# Patient Record
Sex: MALE | Race: WHITE | Employment: FULL TIME | URBAN - METROPOLITAN AREA
[De-identification: names, ages, dates, MRNs, and addresses within clinical notes are randomized per-mention and may not be internally consistent; named-entity substitution may affect disease eponyms.]

---

## 2020-06-29 ENCOUNTER — TELEPHONE (OUTPATIENT)
Dept: NEUROLOGY | Age: 55
End: 2020-06-29

## 2020-06-29 NOTE — TELEPHONE ENCOUNTER
----- Message from Leelee Cueto sent at 6/29/2020 11:21 AM EDT -----  Regarding: Dr. iAden Karimi calling to schedule her  an appt.  Contact is [phone]

## 2020-07-06 ENCOUNTER — TELEPHONE (OUTPATIENT)
Dept: NEUROLOGY | Age: 55
End: 2020-07-06

## 2020-08-25 NOTE — PROGRESS NOTES
Assessment and Plan   Diagnoses and all orders for this visit:    1. Early onset Alzheimer's dementia without behavioral disturbance ([facility]  Followed by Dr. Arleen Salinas with [facility]. Has neuropsych testing pending. Notes a significant cognitive decline over the past year with negative work-up so far    2. Restless leg syndrome  -     FERRITIN; Future  -     IRON PROFILE; Future  Ropinirole has been helping but he is still having a significant amount of kicking at night. Recommend increasing ropinirole to 1.5 mg for a week and then increasing to 2 mg. If symptoms still not well controlled on 2, advised to let me know    3. Other migraine without status migrainosus, not intractable  Well-controlled without medicines. Occurs every couple years    4. Bipolar affective disorder, remission status unspecified ([facility]  5. Mild episode of recurrent major depressive disorder ([facility].)  6. Attention deficit disorder, unspecified hyperactivity presence  Followed by Dr. Diann García with psychiatry. Continue medications as directed by them. Currently on Adderall 30, Lexapro 5, Risperdal 1    7. Acquired hypothyroidism  -     TSH 3RD GENERATION; Future  Takes his medication with other meds. Counseled to take it alone. Denies any hypothyroid symptoms    8. Chronic allergic rhinitis  Had sinus surgery 2 weeks ago with Dr. Maricel Shaver with [facility]. Continue Singulair and Allegra    9. Gastroesophageal reflux disease without esophagitis  Continue pantoprazole. Has tried coming off of pantoprazole before and had significant symptoms    10. Hyperlipidemia, unspecified hyperlipidemia type  -     LIPID PANEL; Future  Denies any issues with rosuvastatin. Continue medication    11. Dry eyes  Continue Restasis. Reports negative Sjogren testing in the past    12. Obesity (BMI 30-39.9)  -     CBC WITH AUTOMATED DIFF; Future  -     HEMOGLOBIN A1C WITH EAG; Future  -     METABOLIC PANEL, COMPREHENSIVE; Future    13.  Routine adult health maintenance  We will try to get colonoscopy report from [facility] in [place]. Done in 2018, recommend 10-year follow-up      Benefits, risks, possible drug interactions, and side effects of all new medications were reviewed with the patient. Pt verbalized understanding. Return to clinic: 6 months for general follow-up or earlier as needed    Parmjit Liang MD  [facility]  8/26/2020    No future appointments. History of Present Illness   Chief Complaint   [facility] care    Tegan  is a 49 y.o. male     Moved from [place]. Recently diagnosed with most likely early Alzheimer's. Wife has noted a significant cognitive decline over the past year. Review of Systems   Constitutional: Negative for chills and fever. HENT: Positive for hearing loss (Working on getting hearing aids). Eyes: Negative for blurred vision. Respiratory: Negative for shortness of breath. Cardiovascular: Negative for chest pain. Gastrointestinal: Negative for abdominal pain, blood in stool, constipation, diarrhea, melena, nausea and vomiting. Genitourinary: Negative for dysuria and hematuria. Musculoskeletal: Negative for joint pain. Skin: Negative for rash. Neurological: Negative for headaches. Past Medical History     Allergies   Allergen Reactions    Iodinated Contrast Media Hives     Non-contrast IVP Dye      Other Medication Other (comments)     Vitorin causes Headache       Rifampin Other (comments)        Current Outpatient Medications   Medication Sig    amphetamine-dextroamphetamine XR (ADDERALL XR) 30 mg XR capsule TAKE 1 CAPSULE BY MOUTH DAILY.  FILL ON OR AFTER 8/14    escitalopram oxalate (LEXAPRO) 5 mg tablet TAKE 1 TABLET BY MOUTH EVERY DAY    levothyroxine (SYNTHROID) 50 mcg tablet TAKE 1 TABLET BY MOUTH EVERY DAY    montelukast (SINGULAIR) 10 mg tablet TAKE 1 TABLET BY MOUTH EVERY DAY    pantoprazole (PROTONIX) 40 mg tablet TAKE 1 TABLET BY MOUTH EVERY DAY    risperiDONE (RisperDAL) 1 mg tablet TAKE 1 TABLET BY MOUTH EVERYDAY AT BEDTIME    rOPINIRole (REQUIP) 1 mg tablet TAKE 1 TABLET BY MOUTH 1 TO 3 HOURS BEFORE BED    rosuvastatin (CRESTOR) 20 mg tablet TAKE 1 TABLET BY MOUTH EVERY DAY    Multivitamins with Fluoride (MULTI-VITAMIN PO) Take  by mouth.  cholecalciferol (VITAMIN D3) (1000 Units /25 mcg) tablet Take  by mouth daily.  Lactobac no.41/Bifidobact no.7 (PROBIOTIC-10 PO) Take  by mouth.  MAGNESIUM PO Take 200 mg by mouth.  BIOTIN PO Take  by mouth.  fexofenadine (ALLEGRA) 180 mg tablet Take  by mouth. No current facility-administered medications for this visit. Patient Active Problem List   Diagnosis Code    Hyperlipidemia, unspecified hyperlipidemia type E78.5    Obesity (BMI 30-39. 9) E66.9    Acquired hypothyroidism E03.9    Restless leg syndrome G25.81    Attention deficit disorder, unspecified hyperactivity presence F98.8    Mild episode of recurrent major depressive disorder (HCC) F33.0    Bipolar disorder (HCC) F31.9    Chronic allergic rhinitis J30.9    Gastroesophageal reflux disease without esophagitis K21.9    Early onset Alzheimer's dementia without behavioral disturbance (HCC) G30.0, F02.80    Dry eyes H04.123    Other migraine without status migrainosus, not intractable G43.809    Vitamin D deficiency E55.9     Past Surgical History:   Procedure Laterality Date    HX APPENDECTOMY      HX CHOLECYSTECTOMY      HX OTHER SURGICAL      Sinus Surgery    HX UROLOGICAL      varicocele repair      Social History     Tobacco Use    Smoking status: Never Smoker    Smokeless tobacco: Never Used   Substance Use Topics    Alcohol use: Yes     Frequency: 2-3 times a week     Drinks per session: 1 or 2     Binge frequency: Never      Family History   Problem Relation Age of Onset    Cancer Mother         breast x2    Bipolar Disorder Father     Stroke Father 68    Cancer Maternal Grandmother     Cancer Paternal Grandmother         ?pancreatic    Thyroid Disease Paternal Grandmother         Physical Exam   Vitals:       Visit Vitals  /68 (BP 1 Location: Left arm, BP Patient Position: Sitting)   Pulse 74   Temp 98.9 °F (37.2 °C) (Oral)   Resp 14   Ht 5' 10.5\" (1.791 m)   Wt 219 lb 3.2 oz (99.4 kg)   SpO2 96%   BMI 31.01 kg/m²        Physical Exam  Constitutional:       General: He is not in acute distress. Appearance: He is well-developed. HENT:      Right Ear: Tympanic membrane, ear canal and external ear normal.      Left Ear: Tympanic membrane, ear canal and external ear normal.   Eyes:      Extraocular Movements: Extraocular movements intact. Conjunctiva/sclera: Conjunctivae normal.   Neck:      Musculoskeletal: Neck supple. Cardiovascular:      Rate and Rhythm: Normal rate and regular rhythm. Pulses: Normal pulses. Heart sounds: No murmur. No friction rub. No gallop. Pulmonary:      Effort: No respiratory distress. Breath sounds: No wheezing or rales. Abdominal:      General: Bowel sounds are normal. There is no distension. Palpations: Abdomen is soft. There is no hepatomegaly, splenomegaly or mass. Tenderness: There is no abdominal tenderness. Hernia: No hernia is present. Skin:     General: Skin is warm. Findings: No rash. Neurological:      Mental Status: He is alert. Gait: Gait normal.   Psychiatric:         Mood and Affect: Mood normal.         Thought Content:  Thought content normal.         Judgment: Judgment normal.          Voice recognition software is utilized and this note may contain transcription errors

## 2020-08-26 ENCOUNTER — OFFICE VISIT (OUTPATIENT)
Dept: INTERNAL MEDICINE CLINIC | Age: 55
End: 2020-08-26

## 2020-08-26 VITALS
OXYGEN SATURATION: 96 % | BODY MASS INDEX: 30.69 KG/M2 | SYSTOLIC BLOOD PRESSURE: 109 MMHG | HEIGHT: 71 IN | DIASTOLIC BLOOD PRESSURE: 68 MMHG | WEIGHT: 219.2 LBS | TEMPERATURE: 98.9 F | RESPIRATION RATE: 14 BRPM | HEART RATE: 74 BPM

## 2020-08-26 DIAGNOSIS — F31.9 BIPOLAR AFFECTIVE DISORDER, REMISSION STATUS UNSPECIFIED (HCC): ICD-10-CM

## 2020-08-26 DIAGNOSIS — E66.9 OBESITY (BMI 30-39.9): ICD-10-CM

## 2020-08-26 DIAGNOSIS — F02.80 EARLY ONSET ALZHEIMER'S DEMENTIA WITHOUT BEHAVIORAL DISTURBANCE (HCC): Primary | ICD-10-CM

## 2020-08-26 DIAGNOSIS — F98.8 ATTENTION DEFICIT DISORDER, UNSPECIFIED HYPERACTIVITY PRESENCE: ICD-10-CM

## 2020-08-26 DIAGNOSIS — J30.9 CHRONIC ALLERGIC RHINITIS: ICD-10-CM

## 2020-08-26 DIAGNOSIS — K21.9 GASTROESOPHAGEAL REFLUX DISEASE WITHOUT ESOPHAGITIS: ICD-10-CM

## 2020-08-26 DIAGNOSIS — E03.9 ACQUIRED HYPOTHYROIDISM: ICD-10-CM

## 2020-08-26 DIAGNOSIS — G43.809 OTHER MIGRAINE WITHOUT STATUS MIGRAINOSUS, NOT INTRACTABLE: ICD-10-CM

## 2020-08-26 DIAGNOSIS — Z00.00 ROUTINE ADULT HEALTH MAINTENANCE: ICD-10-CM

## 2020-08-26 DIAGNOSIS — G30.0 EARLY ONSET ALZHEIMER'S DEMENTIA WITHOUT BEHAVIORAL DISTURBANCE (HCC): Primary | ICD-10-CM

## 2020-08-26 DIAGNOSIS — F33.0 MILD EPISODE OF RECURRENT MAJOR DEPRESSIVE DISORDER (HCC): ICD-10-CM

## 2020-08-26 DIAGNOSIS — E78.5 HYPERLIPIDEMIA, UNSPECIFIED HYPERLIPIDEMIA TYPE: ICD-10-CM

## 2020-08-26 DIAGNOSIS — H04.123 DRY EYES: ICD-10-CM

## 2020-08-26 DIAGNOSIS — G25.81 RESTLESS LEG SYNDROME: ICD-10-CM

## 2020-08-26 PROCEDURE — 99204 OFFICE O/P NEW MOD 45 MIN: CPT | Performed by: INTERNAL MEDICINE

## 2020-08-26 RX ORDER — MELATONIN
DAILY
COMMUNITY

## 2020-08-26 RX ORDER — RISPERIDONE 1 MG/1
2 TABLET, FILM COATED ORAL
COMMUNITY
Start: 2020-08-17 | End: 2022-01-12 | Stop reason: SDUPTHER

## 2020-08-26 RX ORDER — LEVOTHYROXINE SODIUM 50 UG/1
TABLET ORAL
COMMUNITY
Start: 2020-07-31 | End: 2021-09-02 | Stop reason: SDUPTHER

## 2020-08-26 RX ORDER — MINERAL OIL
ENEMA (ML) RECTAL
COMMUNITY
End: 2021-04-01

## 2020-08-26 RX ORDER — DEXTROAMPHETAMINE SACCHARATE, AMPHETAMINE ASPARTATE MONOHYDRATE, DEXTROAMPHETAMINE SULFATE AND AMPHETAMINE SULFATE 7.5; 7.5; 7.5; 7.5 MG/1; MG/1; MG/1; MG/1
CAPSULE, EXTENDED RELEASE ORAL
COMMUNITY
Start: 2020-08-17 | End: 2021-11-10

## 2020-08-26 RX ORDER — ESCITALOPRAM OXALATE 5 MG/1
10 TABLET ORAL DAILY
COMMUNITY
Start: 2020-06-29

## 2020-08-26 RX ORDER — ROPINIROLE 1 MG/1
TABLET, FILM COATED ORAL
COMMUNITY
Start: 2020-08-17 | End: 2020-09-01 | Stop reason: SDUPTHER

## 2020-08-26 RX ORDER — PANTOPRAZOLE SODIUM 40 MG/1
TABLET, DELAYED RELEASE ORAL
COMMUNITY
Start: 2020-07-31

## 2020-08-26 RX ORDER — MONTELUKAST SODIUM 10 MG/1
TABLET ORAL
COMMUNITY
Start: 2020-08-12 | End: 2020-12-23

## 2020-08-26 RX ORDER — ROSUVASTATIN CALCIUM 20 MG/1
TABLET, COATED ORAL
COMMUNITY
Start: 2020-07-31

## 2020-08-26 RX ORDER — CYCLOSPORINE 0.5 MG/ML
1 EMULSION OPHTHALMIC EVERY 12 HOURS
COMMUNITY

## 2020-08-26 NOTE — PATIENT INSTRUCTIONS
Increase ropinirole to 1.5mg for a week then increase to 2mg for a week then message me to let me know how it's working for you.

## 2020-09-01 DIAGNOSIS — G25.81 RESTLESS LEG SYNDROME: Primary | ICD-10-CM

## 2020-09-01 RX ORDER — ROPINIROLE 2 MG/1
2 TABLET, FILM COATED ORAL
Qty: 90 TAB | Refills: 3 | Status: SHIPPED | OUTPATIENT
Start: 2020-09-01 | End: 2021-06-28

## 2020-09-02 LAB
ALBUMIN SERPL-MCNC: 4.4 G/DL (ref 3.8–4.9)
ALBUMIN/GLOB SERPL: 1.9 {RATIO} (ref 1.2–2.2)
ALP SERPL-CCNC: 58 IU/L (ref 39–117)
ALT SERPL-CCNC: 17 IU/L (ref 0–44)
AST SERPL-CCNC: 25 IU/L (ref 0–40)
BASOPHILS # BLD AUTO: 0.1 X10E3/UL (ref 0–0.2)
BASOPHILS NFR BLD AUTO: 1 %
BILIRUB SERPL-MCNC: 0.7 MG/DL (ref 0–1.2)
BUN SERPL-MCNC: 10 MG/DL (ref 6–24)
BUN/CREAT SERPL: 8 (ref 9–20)
CALCIUM SERPL-MCNC: 9.6 MG/DL (ref 8.7–10.2)
CHLORIDE SERPL-SCNC: 103 MMOL/L (ref 96–106)
CHOLEST SERPL-MCNC: 123 MG/DL (ref 100–199)
CO2 SERPL-SCNC: 24 MMOL/L (ref 20–29)
CREAT SERPL-MCNC: 1.21 MG/DL (ref 0.76–1.27)
EOSINOPHIL # BLD AUTO: 0.1 X10E3/UL (ref 0–0.4)
EOSINOPHIL NFR BLD AUTO: 2 %
ERYTHROCYTE [DISTWIDTH] IN BLOOD BY AUTOMATED COUNT: 13.4 % (ref 11.6–15.4)
EST. AVERAGE GLUCOSE BLD GHB EST-MCNC: 117 MG/DL
FERRITIN SERPL-MCNC: 137 NG/ML (ref 30–400)
GLOBULIN SER CALC-MCNC: 2.3 G/DL (ref 1.5–4.5)
GLUCOSE SERPL-MCNC: 108 MG/DL (ref 65–99)
HBA1C MFR BLD: 5.7 % (ref 4.8–5.6)
HCT VFR BLD AUTO: 46.5 % (ref 37.5–51)
HDLC SERPL-MCNC: 68 MG/DL
HGB BLD-MCNC: 15 G/DL (ref 13–17.7)
IMM GRANULOCYTES # BLD AUTO: 0 X10E3/UL (ref 0–0.1)
IMM GRANULOCYTES NFR BLD AUTO: 0 %
INTERPRETATION, 910389: NORMAL
IRON SATN MFR SERPL: 18 % (ref 15–55)
IRON SERPL-MCNC: 57 UG/DL (ref 38–169)
LDLC SERPL CALC-MCNC: 40 MG/DL (ref 0–99)
LYMPHOCYTES # BLD AUTO: 1.9 X10E3/UL (ref 0.7–3.1)
LYMPHOCYTES NFR BLD AUTO: 24 %
MCH RBC QN AUTO: 29 PG (ref 26.6–33)
MCHC RBC AUTO-ENTMCNC: 32.3 G/DL (ref 31.5–35.7)
MCV RBC AUTO: 90 FL (ref 79–97)
MONOCYTES # BLD AUTO: 0.9 X10E3/UL (ref 0.1–0.9)
MONOCYTES NFR BLD AUTO: 11 %
NEUTROPHILS # BLD AUTO: 4.9 X10E3/UL (ref 1.4–7)
NEUTROPHILS NFR BLD AUTO: 62 %
PLATELET # BLD AUTO: 259 X10E3/UL (ref 150–450)
POTASSIUM SERPL-SCNC: 4.4 MMOL/L (ref 3.5–5.2)
PROT SERPL-MCNC: 6.7 G/DL (ref 6–8.5)
RBC # BLD AUTO: 5.17 X10E6/UL (ref 4.14–5.8)
SODIUM SERPL-SCNC: 141 MMOL/L (ref 134–144)
TIBC SERPL-MCNC: 323 UG/DL (ref 250–450)
TRIGL SERPL-MCNC: 76 MG/DL (ref 0–149)
TSH SERPL DL<=0.005 MIU/L-ACNC: 1.59 UIU/ML (ref 0.45–4.5)
UIBC SERPL-MCNC: 266 UG/DL (ref 111–343)
VLDLC SERPL CALC-MCNC: 15 MG/DL (ref 5–40)
WBC # BLD AUTO: 7.9 X10E3/UL (ref 3.4–10.8)

## 2020-09-03 PROBLEM — R73.03 PREDIABETES: Status: ACTIVE | Noted: 2020-09-03

## 2020-09-03 NOTE — PROGRESS NOTES
Addendum   09/03/20   [employer] message sent. A1c prediabetic.   Iron panel normal.  Thyroid studies normal.

## 2020-09-15 ENCOUNTER — E-VISIT (OUTPATIENT)
Dept: INTERNAL MEDICINE CLINIC | Age: 55
End: 2020-09-15
Payer: COMMERCIAL

## 2020-09-15 DIAGNOSIS — L23.7 POISON IVY: Primary | ICD-10-CM

## 2020-09-15 PROCEDURE — 99421 OL DIG E/M SVC 5-10 MIN: CPT | Performed by: INTERNAL MEDICINE

## 2020-09-15 RX ORDER — PREDNISONE 10 MG/1
TABLET ORAL
Qty: 63 TAB | Refills: 0 | Status: SHIPPED | OUTPATIENT
Start: 2020-09-15 | End: 2020-10-03

## 2020-09-15 RX ORDER — HYDROXYZINE PAMOATE 25 MG/1
25 CAPSULE ORAL
Qty: 30 CAP | Refills: 1 | Status: SHIPPED | OUTPATIENT
Start: 2020-09-15 | End: 2020-12-23

## 2020-09-15 RX ORDER — TRIAMCINOLONE ACETONIDE 1 MG/G
CREAM TOPICAL
Qty: 15 G | Refills: 1 | Status: SHIPPED | OUTPATIENT
Start: 2020-09-15 | End: 2020-12-23

## 2020-09-15 NOTE — TELEPHONE ENCOUNTER
Received ED visit for poison ivy rash. Difficult to see on pictures he sent but given history, will treat as poison ivy with medications that have worked in the past including prednisone taper, topical steroid, and Atarax. Advised to let us know if rash does not improve

## 2020-09-21 ENCOUNTER — E-VISIT (OUTPATIENT)
Dept: INTERNAL MEDICINE CLINIC | Age: 55
End: 2020-09-21

## 2020-09-21 DIAGNOSIS — N52.9 ERECTILE DYSFUNCTION, UNSPECIFIED ERECTILE DYSFUNCTION TYPE: ICD-10-CM

## 2020-09-21 DIAGNOSIS — Z11.3 SCREENING EXAMINATION FOR STD (SEXUALLY TRANSMITTED DISEASE): Primary | ICD-10-CM

## 2020-09-21 RX ORDER — SILDENAFIL 100 MG/1
100 TABLET, FILM COATED ORAL AS NEEDED
Qty: 30 TAB | Refills: 1 | Status: SHIPPED | OUTPATIENT
Start: 2020-09-21 | End: 2021-05-31

## 2020-09-22 NOTE — TELEPHONE ENCOUNTER
Patient is interested in starting prep. Will get STD testing first and discuss starting medications at next visit.

## 2020-09-26 LAB
C TRACH RRNA SPEC QL NAA+PROBE: NEGATIVE
HBV CORE AB SERPL QL IA: NEGATIVE
HBV CORE IGM SERPL QL IA: NEGATIVE
HBV SURFACE AB SER QL: NON REACTIVE
HBV SURFACE AG SERPL QL IA: NEGATIVE
HCV AB S/CO SERPL IA: 0.1 S/CO RATIO (ref 0–0.9)
HIV 1+2 AB+HIV1 P24 AG SERPL QL IA: NON REACTIVE
N GONORRHOEA RRNA SPEC QL NAA+PROBE: NEGATIVE
T VAGINALIS DNA SPEC QL NAA+PROBE: NEGATIVE
TREPONEMA PALLIDUM IGG+IGM AB [PRESENCE] IN SERUM OR PLASMA BY IMMUNOASSAY: NON REACTIVE

## 2020-09-28 NOTE — PROGRESS NOTES
Reissued message sent. Gonorrhea, chlamydia, trichomonas negative. Hepatitis B-. No surface antibody. Syphilis negative. HIV negative. Hepatitis C negative. Has appointment tomorrow to discuss prep. We will also see if he has already had 2 rounds of hepatitis B vaccines. If not, recommend getting it.

## 2020-09-28 NOTE — TELEPHONE ENCOUNTER
[employer] message sent. Gonorrhea, chlamydia, trichomonas negative. Hepatitis B-. No surface antibody. Syphilis negative. HIV negative. Hepatitis C negative. Has appointment tomorrow to discuss prep. We will also see if he has already had 2 rounds of hepatitis B vaccines. If not, recommend getting it.

## 2020-11-04 DIAGNOSIS — L23.7 POISON IVY: ICD-10-CM

## 2020-11-05 RX ORDER — PREDNISONE 10 MG/1
TABLET ORAL
Qty: 63 TAB | Refills: 0 | OUTPATIENT
Start: 2020-11-05

## 2020-11-05 NOTE — TELEPHONE ENCOUNTER
Spoke to patients wife - did not request refill - will disregard.     Kay De Los Santos, PharmD, BCPS, CDCES

## 2020-12-05 ENCOUNTER — HOSPITAL ENCOUNTER (EMERGENCY)
Age: 55
Discharge: HOME | End: 2020-12-05
Attending: EMERGENCY MEDICINE
Payer: COMMERCIAL

## 2020-12-05 VITALS
OXYGEN SATURATION: 98 % | HEART RATE: 89 BPM | RESPIRATION RATE: 16 BRPM | SYSTOLIC BLOOD PRESSURE: 119 MMHG | WEIGHT: 214.29 LBS | BODY MASS INDEX: 30.68 KG/M2 | TEMPERATURE: 97.5 F | HEIGHT: 70 IN | DIASTOLIC BLOOD PRESSURE: 72 MMHG

## 2020-12-05 DIAGNOSIS — S61.213A LACERATION OF LEFT MIDDLE FINGER WITHOUT FOREIGN BODY WITHOUT DAMAGE TO NAIL, INITIAL ENCOUNTER: Primary | ICD-10-CM

## 2020-12-05 PROCEDURE — 77030010507 HC ADH SKN DERMBND J&J -B

## 2020-12-05 PROCEDURE — 99282 EMERGENCY DEPT VISIT SF MDM: CPT

## 2020-12-05 PROCEDURE — 90715 TDAP VACCINE 7 YRS/> IM: CPT | Performed by: STUDENT IN AN ORGANIZED HEALTH CARE EDUCATION/TRAINING PROGRAM

## 2020-12-05 PROCEDURE — 74011250636 HC RX REV CODE- 250/636: Performed by: STUDENT IN AN ORGANIZED HEALTH CARE EDUCATION/TRAINING PROGRAM

## 2020-12-05 PROCEDURE — 77030002916 HC SUT ETHLN J&J -A

## 2020-12-05 PROCEDURE — 75810000293 HC SIMP/SUPERF WND  RPR

## 2020-12-05 PROCEDURE — 74011000250 HC RX REV CODE- 250: Performed by: STUDENT IN AN ORGANIZED HEALTH CARE EDUCATION/TRAINING PROGRAM

## 2020-12-05 PROCEDURE — 90471 IMMUNIZATION ADMIN: CPT

## 2020-12-05 PROCEDURE — 2709999900 HC NON-CHARGEABLE SUPPLY

## 2020-12-05 RX ORDER — CEPHALEXIN 500 MG/1
500 CAPSULE ORAL 2 TIMES DAILY
Qty: 10 CAP | Refills: 0 | Status: SHIPPED | OUTPATIENT
Start: 2020-12-05 | End: 2020-12-10

## 2020-12-05 RX ORDER — DONEPEZIL HYDROCHLORIDE 10 MG/1
10 TABLET, FILM COATED ORAL
COMMUNITY
End: 2022-01-28

## 2020-12-05 RX ORDER — LIDOCAINE HYDROCHLORIDE 10 MG/ML
10 INJECTION, SOLUTION EPIDURAL; INFILTRATION; INTRACAUDAL; PERINEURAL ONCE
Status: COMPLETED | OUTPATIENT
Start: 2020-12-05 | End: 2020-12-05

## 2020-12-05 RX ORDER — SULFAMETHOXAZOLE AND TRIMETHOPRIM 800; 160 MG/1; MG/1
1 TABLET ORAL 2 TIMES DAILY
Qty: 14 TAB | Refills: 0 | Status: SHIPPED | OUTPATIENT
Start: 2020-12-05 | End: 2020-12-05

## 2020-12-05 RX ADMIN — LIDOCAINE HYDROCHLORIDE 10 ML: 10 INJECTION, SOLUTION EPIDURAL; INFILTRATION; INTRACAUDAL; PERINEURAL at 12:45

## 2020-12-05 RX ADMIN — TETANUS TOXOID, REDUCED DIPHTHERIA TOXOID AND ACELLULAR PERTUSSIS VACCINE, ADSORBED 0.5 ML: 5; 2.5; 8; 8; 2.5 SUSPENSION INTRAMUSCULAR at 12:45

## 2020-12-05 NOTE — DISCHARGE INSTRUCTIONS
Patient Education        Cuts on the Hand Closed With Stitches: Care Instructions  Your Care Instructions     A cut on your hand can be on your fingers, your thumb, or the front or back of your hand. Sometimes a cut can injure the tendons, blood vessels, or nerves of your hand. The doctor used stitches to close the cut. Using stitches also helps the cut heal and reduces scarring. The doctor may have given you a splint to help prevent you from moving your hand, fingers, or thumb. If the cut went deep and through the skin, the doctor put in two layers of stitches. The deeper layer brings the deep part of the cut together. These stitches will dissolve and don't need to be removed. The stitches in the upper layer are the ones you see on the cut. You will probably have a bandage. You will need to have the stitches removed, usually in 7 to 14 days. The doctor may suggest that you see a hand specialist if the cut is very deep or if you have trouble moving your fingers or have less feeling in your hand. The doctor has checked you carefully, but problems can develop later. If you notice any problems or new symptoms, get medical treatment right away. Follow-up care is a key part of your treatment and safety. Be sure to make and go to all appointments, and call your doctor if you are having problems. It's also a good idea to know your test results and keep a list of the medicines you take. How can you care for yourself at home? · Keep the cut dry for the first 24 to 48 hours. After this, you can shower if your doctor okays it. Pat the cut dry. · Don't soak the cut, such as in a bathtub. Your doctor will tell you when it's safe to get the cut wet. · If your doctor told you how to care for your cut, follow your doctor's instructions. If you did not get instructions, follow this general advice:  ? After the first 24 to 48 hours, wash around the cut with clean water 2 times a day.  Don't use hydrogen peroxide or alcohol, which can slow healing. ? You may cover the cut with a thin layer of petroleum jelly, such as Vaseline, and a nonstick bandage. ? Apply more petroleum jelly and replace the bandage as needed. · Prop up the sore hand on a pillow anytime you sit or lie down during the next 3 days. Try to keep it above the level of your heart. This will help reduce swelling. · Avoid any activity that could cause your cut to reopen. · Do not remove the stitches on your own. Your doctor will tell you when to come back to have the stitches removed. · Be safe with medicines. Take pain medicines exactly as directed. ? If the doctor gave you a prescription medicine for pain, take it as prescribed. ? If you are not taking a prescription pain medicine, ask your doctor if you can take an over-the-counter medicine. When should you call for help? Call your doctor now or seek immediate medical care if:    · You have new pain, or your pain gets worse.     · The skin near the cut is cold or pale or changes color.     · You have tingling, weakness, or numbness near the cut.     · The cut starts to bleed, and blood soaks through the bandage. Oozing small amounts of blood is normal.     · You have trouble moving the area of the hand near the cut.     · You have symptoms of infection, such as:  ? Increased pain, swelling, warmth, or redness around the cut.  ? Red streaks leading from the cut.  ? Pus draining from the cut.  ? A fever. Watch closely for changes in your health, and be sure to contact your doctor if:    · You do not get better as expected. Where can you learn more? Go to http://www.gray.com/  Enter T250 in the search box to learn more about \"Cuts on the Hand Closed With Stitches: Care Instructions. \"  Current as of: June 26, 2019               Content Version: 12.6  © 6446-7871 [employer].    Care instructions adapted under license by [employer] (which disclaims liability or warranty for this information). If you have questions about a medical condition or this instruction, always ask your healthcare professional. [place] [address] any warranty or liability for your use of this information.

## 2020-12-05 NOTE — ED TRIAGE NOTES
Pt presents to ED with c/o laceration to volar surface of left 3rd finger at PIP joint. Pt states he dropped an object that he was carrying onto the finger. Pt states some loss of sensation at distal tip of finger.

## 2020-12-05 NOTE — ED PROVIDER NOTES
Patient is a 48year old male who presents to ED c/o laceration to left middle finger which occurred PTA. Patient reports he was lifting up his tool box when it came down and cut his finger. Patient reports he has full ROM of his finger, but that he cannot stop the bleeding. Patient reports slight numbness to the volar aspect of the left middle finger distal to the laceration at PIP joint. Patient denies any other injury. Patient reports last tetanus vaccine was in 2014.             Past Medical History:   Diagnosis Date    Bipolar disorder ([facility]     Restless leg syndrome     Vitamin D deficiency        Past Surgical History:   Procedure Laterality Date    HX APPENDECTOMY      HX CHOLECYSTECTOMY      HX OTHER SURGICAL      Sinus Surgery    HX UROLOGICAL      varicocele repair         Family History:   Problem Relation Age of Onset   [facility] Cancer Mother         breast x2    Bipolar Disorder Father     Stroke Father 68    Cancer Maternal Grandmother     Cancer Paternal Grandmother         ?pancreatic    Thyroid Disease Paternal Grandmother        Social History     Socioeconomic History    Marital status:      Spouse name: Not on file    Number of children: Not on file    Years of education: Not on file    Highest education level: Not on file   Occupational History    Not on file   Social Needs    Financial resource strain: Not on file    Food insecurity     Worry: Not on file     Inability: Not on file    Transportation needs     Medical: Not on file     Non-medical: Not on file   Tobacco Use    Smoking status: Never Smoker    Smokeless tobacco: Never Used   Substance and Sexual Activity    Alcohol use: Yes     Frequency: 2-3 times a week     Drinks per session: 1 or 2     Binge frequency: Never    Drug use: Never    Sexual activity: Yes     Partners: Female, Male     Birth control/protection: Surgical   Lifestyle    Physical activity     Days per week: Not on file     Minutes per session: Not on file    Stress: Not on file   Relationships    Social connections     Talks on phone: Not on file     Gets together: Not on file     Attends Restorationism service: Not on file     Active member of club or organization: Not on file     Attends meetings of clubs or organizations: Not on file     Relationship status: Not on file    Intimate partner violence     Fear of current or ex partner: Not on file     Emotionally abused: Not on file     Physically abused: Not on file     Forced sexual activity: Not on file   Other Topics Concern    Not on file   Social History Narrative    Not on file         ALLERGIES: Iodinated contrast media; Other medication; and Rifampin    Review of Systems   Constitutional: Negative for chills and fever. Musculoskeletal: Negative for back pain, gait problem, joint swelling, neck pain and neck stiffness. Skin: Positive for wound. Negative for color change, pallor and rash. Allergic/Immunologic: Negative for immunocompromised state. Neurological: Positive for numbness. Negative for syncope and weakness. Vitals:    12/05/20 1216   BP: 119/72   Pulse: 89   Resp: 16   Temp: 97.5 °F (36.4 °C)   SpO2: 98%   Weight: 97.2 kg (214 lb 4.6 oz)   Height: 5' 10\" (1.778 m)            Physical Exam  Vitals signs and nursing note reviewed. Constitutional:       Appearance: Normal appearance. He is well-developed. Comments: Pleasant male in NAD   HENT:      Head: Normocephalic and atraumatic. Nose: Nose normal.      Mouth/Throat:      Mouth: Mucous membranes are moist.   Eyes:      General: Lids are normal.      Extraocular Movements: Extraocular movements intact. Conjunctiva/sclera: Conjunctivae normal.   Neck:      Musculoskeletal: Normal range of motion and neck supple. Cardiovascular:      Rate and Rhythm: Normal rate. Pulses: Normal pulses.       Heart sounds: S1 normal and S2 normal.   Pulmonary:      Effort: Pulmonary effort is normal. No accessory muscle usage.   Abdominal:      General: Abdomen is flat. Palpations: Abdomen is soft. Musculoskeletal: Normal range of motion. Skin:     General: Skin is warm. Capillary Refill: Capillary refill takes less than 2 seconds. Comments: Left middle finger with laceration to the volar aspect of the PIP joint. Full ROM and 5/5 strength in flexion and extension at PIP and DIP joints. Cap refill 2+. Neurological:      General: No focal deficit present. Mental Status: He is alert and oriented to person, place, and time. Mental status is at baseline. Psychiatric:         Attention and Perception: Attention normal.         Mood and Affect: Mood and affect normal.         Speech: Speech normal.         Behavior: Behavior normal. Behavior is cooperative. Thought Content: Thought content normal.         Cognition and Memory: Cognition normal.         Judgment: Judgment normal.          MDM  Number of Diagnoses or Management Options  Laceration of left middle finger without foreign body without damage to nail, initial encounter:   Diagnosis management comments: Laceration repaired without any immediate complications. Will start patient on Cephlexin for 5 days due to contaminated wound and location over PIP joint. Advised patient to keep finger straight for the next few days in order to allow laceration to heal. Discussed with patient plan to have sutures removed in 7-10 days. Patient has improved with ED treatment. Patient's results have been reviewed with them. Patient verbally conveyed their understanding and agreement of the signs, symptoms, diagnosis and treatment plan and agrees to follow up as recommended. Discharge instructions have also been provided to the patient with some educational information regarding their diagnosis as well a list of reasons why they would want to return to the ED prior to their follow-up appointment should their condition change.          Amount and/or Complexity of Data Reviewed  Discuss the patient with other providers: yes (Dr. Geo Mock, ED Attending)           Wound Repair    Date/Time: 12/5/2020 2:21 PM  Performed by: PAPreparation: skin prepped with alcohol  Location details: left long finger  Wound length: 2cm.   Anesthesia: local infiltration    Anesthesia:  Local Anesthetic: lidocaine 1% without epinephrine  Anesthetic total: 5 mL  Foreign bodies: no foreign bodies  Irrigation solution: saline  Irrigation method: syringe  Debridement: minimal  Skin closure: 4-0 nylon  Number of sutures: 4  Technique: simple  Approximation: close  Dressing: tube gauze  Patient tolerance: Patient tolerated the procedure well with no immediate complications

## 2020-12-13 ENCOUNTER — HOSPITAL ENCOUNTER (EMERGENCY)
Age: 55
Discharge: HOME | End: 2020-12-13
Attending: EMERGENCY MEDICINE
Payer: COMMERCIAL

## 2020-12-13 VITALS
RESPIRATION RATE: 18 BRPM | OXYGEN SATURATION: 96 % | WEIGHT: 219.58 LBS | HEIGHT: 70 IN | HEART RATE: 84 BPM | TEMPERATURE: 97.7 F | BODY MASS INDEX: 31.44 KG/M2 | DIASTOLIC BLOOD PRESSURE: 69 MMHG | SYSTOLIC BLOOD PRESSURE: 129 MMHG

## 2020-12-13 DIAGNOSIS — L08.9 WOUND INFECTION: Primary | ICD-10-CM

## 2020-12-13 DIAGNOSIS — T14.8XXA WOUND INFECTION: Primary | ICD-10-CM

## 2020-12-13 PROCEDURE — 99282 EMERGENCY DEPT VISIT SF MDM: CPT

## 2020-12-13 PROCEDURE — 74011250637 HC RX REV CODE- 250/637: Performed by: EMERGENCY MEDICINE

## 2020-12-13 PROCEDURE — 74011000250 HC RX REV CODE- 250: Performed by: EMERGENCY MEDICINE

## 2020-12-13 RX ORDER — BACITRACIN 500 UNIT/G
1 PACKET (EA) TOPICAL
Status: COMPLETED | OUTPATIENT
Start: 2020-12-13 | End: 2020-12-13

## 2020-12-13 RX ORDER — SULFAMETHOXAZOLE AND TRIMETHOPRIM 800; 160 MG/1; MG/1
1 TABLET ORAL 2 TIMES DAILY
Qty: 20 TAB | Refills: 0 | Status: SHIPPED | OUTPATIENT
Start: 2020-12-13 | End: 2020-12-23

## 2020-12-13 RX ORDER — SULFAMETHOXAZOLE AND TRIMETHOPRIM 800; 160 MG/1; MG/1
1 TABLET ORAL
Status: COMPLETED | OUTPATIENT
Start: 2020-12-13 | End: 2020-12-13

## 2020-12-13 RX ADMIN — SULFAMETHOXAZOLE AND TRIMETHOPRIM 1 TABLET: 800; 160 TABLET ORAL at 21:22

## 2020-12-13 RX ADMIN — BACITRACIN 1 PACKET: 500 OINTMENT TOPICAL at 21:21

## 2020-12-14 NOTE — ED TRIAGE NOTES
Patient complains of redness and swelling to left index finger. Reports having stitches placed her this past week.

## 2020-12-14 NOTE — DISCHARGE INSTRUCTIONS
Patient Education        Wound infection: Care Instructions  Your Care Instructions  Sometimes a cut may opens when it isn't supposed to. This may be because of an infection or another problem that keeps the cut's edges from staying together. The doctor has checked your open cut. He or she may have put a dressing in the cut but left it open to heal. This lets the cut heal from the bottom up. Your doctor may have given you a vacuum device to take home that helps close the cut. A cut may be left open when it is infected or likely to become infected. This is because closing the cut may make an existing infection worse and a new infection more likely. You will have a bandage. Follow-up care is a key part of your treatment and safety. Be sure to make and go to all appointments, and call your doctor if you are having problems. It's also a good idea to know your test results and keep a list of the medicines you take. How can you care for yourself at home? · You may shower with soap and water. Your doctor will tell you when it is safe to use a bathtub or go swimming. · If your doctor told you how to care for your cut, follow your doctor's instructions. If you did not get instructions, follow this general advice:  ? Wash around the cut with clean water 2 times a day. Don't use hydrogen peroxide or alcohol, which can slow healing. · Avoid any activity that could cause your cut to get worse. For example, if your cut is in the belly, avoid lifting anything that would make you strain. This may include heavy grocery bags and milk containers, a heavy briefcase or backpack, cat litter or dog food bags, a vacuum , or a child. · Take pain medicines exactly as directed. ? If the doctor gave you a prescription medicine for pain, take it as prescribed. ? If you are not taking a prescription pain medicine, ask your doctor if you can take an over-the-counter medicine.   · If your doctor prescribed antibiotics, take them as directed. Do not stop taking them just because you feel better. You need to take the full course of antibiotics. · If your cut is packed (gauze is put into the cut), follow your doctor's instructions on how often and how to repack the cut. A home health worker may do this for you. When should you call for help? Call your doctor now or seek immediate medical care if:    · The cut gets bigger.     · You can see organs under the skin.     · You have new pain, or your pain gets worse.     · The cut starts to bleed, and blood soaks through the bandage. Oozing small amounts of blood is normal.     · The skin near the cut is cold or pale or changes color.     · You have tingling, weakness, or numbness near the cut.     · You have trouble moving the area near the cut.     · You have symptoms of infection, such as:  ? Increased pain, swelling, warmth, or redness around the cut.  ? Red streaks leading from the cut.  ? Pus draining from the cut.  ? A fever. Watch closely for changes in your health, and be sure to contact your doctor if:    · The cut is not closing (getting smaller).     · You do not get better as expected. Where can you learn more? Go to http://www.gray.com/  Enter L260 in the search box to learn more about \"Opened Cut After Surgery: Care Instructions. \"  Current as of: June 26, 2019               Content Version: 12.6  © 7511-1910 [employer]. Care instructions adapted under license by [employer] (which disclaims liability or warranty for this information). If you have questions about a medical condition or this instruction, always ask your healthcare professional. [address] any warranty or liability for your use of this information.

## 2020-12-14 NOTE — ED PROVIDER NOTES
The patient is a 64-year-old male who is 8 days status post repair of finger laceration and presents to the ED with a complaint of increased swelling, redness to the finger laceration. The patient was discharged on 5 days of Keflex that he took.   He denies any fever, shortness of breath, increased pain and purulent drainage to the area           Past Medical History:   Diagnosis Date    Bipolar disorder ([address]     Restless leg syndrome     Vitamin D deficiency        Past Surgical History:   Procedure Laterality Date    HX APPENDECTOMY      HX CHOLECYSTECTOMY      HX OTHER SURGICAL      Sinus Surgery    HX UROLOGICAL      varicocele repair         Family History:   Problem Relation Age of Onset   [address] [facility] Cancer Mother         breast x2    Bipolar Disorder Father     Stroke Father 68    Cancer Maternal Grandmother     Cancer Paternal Grandmother         ?pancreatic    Thyroid Disease Paternal Grandmother        Social History     Socioeconomic History    Marital status:      Spouse name: Not on file    Number of children: Not on file    Years of education: Not on file    Highest education level: Not on file   Occupational History    Not on file   Social Needs    Financial resource strain: Not on file    Food insecurity     Worry: Not on file     Inability: Not on file    Transportation needs     Medical: Not on file     Non-medical: Not on file   Tobacco Use    Smoking status: Never Smoker    Smokeless tobacco: Never Used   Substance and Sexual Activity    Alcohol use: Yes     Frequency: 2-3 times a week     Drinks per session: 1 or 2     Binge frequency: Never    Drug use: Never    Sexual activity: Yes     Partners: Female, Male     Birth control/protection: Surgical   Lifestyle    Physical activity     Days per week: Not on file     Minutes per session: Not on file    Stress: Not on file   Relationships    Social connections     Talks on phone: Not on file     Gets together: Not on file Attends Yazidism service: Not on file     Active member of club or organization: Not on file     Attends meetings of clubs or organizations: Not on file     Relationship status: Not on file    Intimate partner violence     Fear of current or ex partner: Not on file     Emotionally abused: Not on file     Physically abused: Not on file     Forced sexual activity: Not on file   Other Topics Concern    Not on file   Social History Narrative    Not on file         ALLERGIES: Iodinated contrast media; Other medication; and Rifampin    Review of Systems   All other systems reviewed and are negative. Vitals:    12/13/20 2106   BP: 129/69   Pulse: 84   Resp: 18   Temp: 97.7 °F (36.5 °C)   SpO2: 96%   Weight: 99.6 kg (219 lb 9.3 oz)   Height: 5' 10\" (1.778 m)            Physical Exam  Vitals signs and nursing note reviewed. Exam conducted with a chaperone present. Musculoskeletal:        Hands:         CONSTITUTIONAL: Well-appearing; well-nourished; in no apparent distress  HEAD: Normocephalic; atraumatic  EYES: PERRL; EOM intact; conjunctiva and sclera are clear bilaterally. ENT: No rhinorrhea; normal pharynx with no tonsillar hypertrophy; mucous membranes pink/moist, no erythema, no exudate. NECK: Supple; non-tender; no cervical lymphadenopathy  CARD: Normal S1, S2; no murmurs, rubs, or gallops. Regular rate and rhythm. RESP: Normal respiratory effort; breath sounds clear and equal bilaterally; no wheezes, rhonchi, or rales. ABD: Normal bowel sounds; non-distended; non-tender; no palpable organomegaly, no masses, no bruits. Back Exam: Normal inspection; no vertebral point tenderness, no CVA tenderness. Normal range of motion. SKIN: Warm; dry; no rash.   NEURO:Alert and oriented x 3, coherent, LEOLA-XII grossly intact, sensory and motor are non-focal.        MDM  Number of Diagnoses or Management Options  Wound infection:   Diagnosis management comments: Assessment: Wound dehiscence of left middle finger without any abscess or drainage    Plan: Remove sutures/dressing change/antibiotic/ Monitor and Reevaluate. Amount and/or Complexity of Data Reviewed  Clinical lab tests: ordered and reviewed  Tests in the radiology section of CPT®: ordered and reviewed  Tests in the medicine section of CPT®: reviewed and ordered  Discussion of test results with the performing providers: yes  Decide to obtain previous medical records or to obtain history from someone other than the patient: yes  Obtain history from someone other than the patient: yes  Review and summarize past medical records: yes  Discuss the patient with other providers: yes  Independent visualization of images, tracings, or specimens: yes           Procedures      Progress Note:   Pt has been reexamined by Olya Ortega MD. Pt is feeling much better. Symptoms have improved. All available results have been reviewed with pt and any available family. Pt understands sx, dx, and tx in ED. Care plan has been outlined and questions have been answered. Pt is ready to go home. Will send home on wound dehiscence instruction. Prescription of Bactrim. Outpatient referral with PCP/in 5 days if no improvement of symptoms or discomfort and further treatment as needed. Written by Olya Ortega MD,11:29 PM    .   .

## 2020-12-15 ENCOUNTER — PATIENT MESSAGE (OUTPATIENT)
Dept: INTERNAL MEDICINE CLINIC | Age: 55
End: 2020-12-15

## 2020-12-22 NOTE — PROGRESS NOTES
Assessment and Plan   Diagnoses and all orders for this visit:    1. Urinary urgency  -     PSA W/ REFLX FREE PSA; Future  -     AMB POC URINALYSIS DIP STICK AUTO W/O MICRO  -     tamsulosin (Flomax) 0.4 mg capsule; Take 1 Cap by mouth daily. Reports over the past year, he has noticed increased urgency and nocturia. Denies any change in stream or dribbling. However, has noted in the past 2 weeks that his urgency has gotten worse and that he feels like he has to urinate immediately. No change in volume of urine. Denies any dysuria, fever, chills, bety pain, back pain, nausea, vomiting. UA negative for infection. Recommend a trial of tamsulosin. If no improvement, consider urology referral.  Also recommend checking PSA trying to extract    2. Onychomycosis  -     HEPATIC FUNCTION PANEL; Future  -     CBC WITH AUTOMATED DIFF; Future  -     terbinafine HCL (LAMISIL) 250 mg tablet; Take 1 Tab by mouth daily. Indications: onychomycosis  -     AMB POC URINALYSIS DIP STICK AUTO W/O MICRO  Noticed thickening and darkening of his right big toe and starting to have thickening and yellowing of his right pinky toe. Was told previously to use Vicks but reports it is difficult for him to remember to do something topically every day. He wants to do something p.o. We will start on terbinafine. Advised to get lab work done in a month and a half to monitor medication. 3. On pre-exposure prophylaxis for HIV  Stopped using Truvada as he no longer needed it. Advised that if he ever decides to restart, he needs to get an HIV and STD testing done before. Benefits, risks, possible drug interactions, and side effects of all new medications were reviewed with the patient. Pt verbalized understanding. Return to clinic: 3 months    Merlin Chu MD  [facility]  12/23/2020    No future appointments.      Subjective   Chief Complaint   Urgency    Javier Williamson is a 48 y.o. male Review of Systems   Constitutional: Negative for chills and fever. Respiratory: Negative for shortness of breath. Cardiovascular: Negative for chest pain. Objective   Vitals:       Visit Vitals  /73 (BP 1 Location: Left arm, BP Patient Position: Sitting)   Pulse 92   Temp 98.7 °F (37.1 °C) (Oral)   Resp 16   Ht 5' 10\" (1.778 m)   Wt 218 lb (98.9 kg)   SpO2 100%   BMI 31.28 kg/m²        Physical Exam  Constitutional:       Appearance: Normal appearance. He is not ill-appearing. Cardiovascular:      Rate and Rhythm: Normal rate. Pulmonary:      Effort: No respiratory distress. Skin:     Comments: Right 1st toe thick, dark brown, dystrophic. Right 5th toe yellow   Neurological:      Mental Status: He is alert. Gait: Gait normal.   Psychiatric:         Mood and Affect: Mood normal.         Thought Content: Thought content normal.         Judgment: Judgment normal.          Current Outpatient Medications   Medication Sig    terbinafine HCL (LAMISIL) 250 mg tablet Take 1 Tab by mouth daily. Indications: onychomycosis    tamsulosin (Flomax) 0.4 mg capsule Take 1 Cap by mouth daily.  donepeziL (ARICEPT) 10 mg tablet Take 10 mg by mouth nightly.  sildenafil citrate (VIAGRA) 100 mg tablet Take 1 Tab by mouth as needed for Erectile Dysfunction. Max dose: 1 tab daily    rOPINIRole (REQUIP) 2 mg tablet Take 1 Tab by mouth nightly. Take 1-3hrs before bed  Indications: restless legs syndrome, an extreme discomfort in the calf muscles when sitting or lying down    amphetamine-dextroamphetamine XR (ADDERALL XR) 30 mg XR capsule TAKE 1 CAPSULE BY MOUTH DAILY. FILL ON OR AFTER 8/14    escitalopram oxalate (LEXAPRO) 5 mg tablet Take 5 mg by mouth daily.  levothyroxine (SYNTHROID) 50 mcg tablet TAKE 1 TABLET BY MOUTH EVERY DAY    pantoprazole (PROTONIX) 40 mg tablet TAKE 1 TABLET BY MOUTH EVERY DAY    risperiDONE (RisperDAL) 1 mg tablet 2 mg.     rosuvastatin (CRESTOR) 20 mg tablet TAKE 1 TABLET BY MOUTH EVERY DAY    Multivitamins with Fluoride (MULTI-VITAMIN PO) Take  by mouth.  cholecalciferol (VITAMIN D3) (1000 Units /25 mcg) tablet Take  by mouth daily.  Lactobac no.41/Bifidobact no.7 (PROBIOTIC-10 PO) Take  by mouth.  MAGNESIUM PO Take 200 mg by mouth.  BIOTIN PO Take  by mouth.  fexofenadine (ALLEGRA) 180 mg tablet Take  by mouth.  cycloSPORINE (Restasis) 0.05 % dpet Administer 1 Drop to both eyes every twelve (12) hours.  emtricitabine-tenofovir, TDF, (TRUVADA) 200-300 mg per tablet Take 1 Tab by mouth daily. Indications: HIV infection pre-exposure prophylaxis (Patient not taking: Reported on 12/23/2020)     No current facility-administered medications for this visit.

## 2020-12-23 ENCOUNTER — OFFICE VISIT (OUTPATIENT)
Dept: INTERNAL MEDICINE CLINIC | Age: 55
End: 2020-12-23
Payer: COMMERCIAL

## 2020-12-23 VITALS
WEIGHT: 218 LBS | SYSTOLIC BLOOD PRESSURE: 118 MMHG | HEIGHT: 70 IN | BODY MASS INDEX: 31.21 KG/M2 | HEART RATE: 92 BPM | DIASTOLIC BLOOD PRESSURE: 73 MMHG | OXYGEN SATURATION: 100 % | RESPIRATION RATE: 16 BRPM | TEMPERATURE: 98.7 F

## 2020-12-23 DIAGNOSIS — R39.15 URINARY URGENCY: Primary | ICD-10-CM

## 2020-12-23 DIAGNOSIS — Z79.899 ON PRE-EXPOSURE PROPHYLAXIS FOR HIV: ICD-10-CM

## 2020-12-23 DIAGNOSIS — B35.1 ONYCHOMYCOSIS: ICD-10-CM

## 2020-12-23 LAB
BILIRUB UR QL STRIP: NEGATIVE
GLUCOSE UR-MCNC: NEGATIVE MG/DL
KETONES P FAST UR STRIP-MCNC: NEGATIVE MG/DL
PH UR STRIP: 6 [PH] (ref 4.6–8)
PROT UR QL STRIP: NEGATIVE
SP GR UR STRIP: 1.01 (ref 1–1.03)
UA UROBILINOGEN AMB POC: NORMAL (ref 0.2–1)
URINALYSIS CLARITY POC: CLEAR
URINALYSIS COLOR POC: YELLOW
URINE BLOOD POC: NEGATIVE
URINE LEUKOCYTES POC: NEGATIVE
URINE NITRITES POC: NEGATIVE

## 2020-12-23 PROCEDURE — 99214 OFFICE O/P EST MOD 30 MIN: CPT | Performed by: INTERNAL MEDICINE

## 2020-12-23 PROCEDURE — 81003 URINALYSIS AUTO W/O SCOPE: CPT | Performed by: INTERNAL MEDICINE

## 2020-12-23 RX ORDER — TAMSULOSIN HYDROCHLORIDE 0.4 MG/1
0.4 CAPSULE ORAL DAILY
Qty: 30 CAP | Refills: 3 | Status: SHIPPED | OUTPATIENT
Start: 2020-12-23 | End: 2021-05-10

## 2020-12-23 RX ORDER — TERBINAFINE HYDROCHLORIDE 250 MG/1
250 TABLET ORAL DAILY
Qty: 90 TAB | Refills: 0 | Status: SHIPPED | OUTPATIENT
Start: 2020-12-23 | End: 2021-08-17

## 2020-12-23 NOTE — Clinical Note
Please try to get colonoscopy from [facility] in [facility] Ali[facility] in 2018. Thanks!

## 2020-12-23 NOTE — PROGRESS NOTES
Adelaida Chadwick is a 48 y.o. male    Chief Complaint   Patient presents with    Benign Prostatic Hypertrophy    Skin Problem     fungus on toes - tried to treat it at home but didn't work       Health Maintenance Due   Topic Date Due    Colorectal Cancer Screening Combo  10/29/2016    Flu Vaccine (1) 09/01/2020       Visit Vitals  /73 (BP 1 Location: Left arm, BP Patient Position: Sitting)   Pulse 92   Temp 98.7 °F (37.1 °C) (Oral)   Resp 16   Ht 5' 10\" (1.778 m)   Wt 218 lb (98.9 kg)   SpO2 100%   BMI 31.28 kg/m²       3 most recent PHQ Screens 8/26/2020   Little interest or pleasure in doing things Not at all   Feeling down, depressed, irritable, or hopeless Several days   Total Score PHQ 2 1       Abuse Screening Questionnaire 8/26/2020   Do you ever feel afraid of your partner? N   Are you in a relationship with someone who physically or mentally threatens you? N   Is it safe for you to go home? Y         1. Have you been to the ER, urgent care clinic since your last visit? Hospitalized since your last visit? YES. [facility] 12/13/2020 for cut finger. 2. Have you seen or consulted any other health care providers outside of the [address] [place] [address] since your last visit? Include any pap smears or colon screening.  No

## 2020-12-28 ENCOUNTER — PATIENT MESSAGE (OUTPATIENT)
Dept: INTERNAL MEDICINE CLINIC | Age: 55
End: 2020-12-28

## 2020-12-28 NOTE — TELEPHONE ENCOUNTER
Per PCP request, mailed patient the medical release form that needs to be signed from [facility] in [place]. Nurse filled out a portion of the medical release form, but patient needs to sign, date & return the form to PCP's office to be submitted to [facility] for a copy of patient's 2018 colonoscopy report & applicable pathology report. Nurse included a note in with the mailed medical release form explaining to the patient what was needed.  Thank you

## 2021-03-31 NOTE — PROGRESS NOTES
Assessment and Plan   Diagnoses and all orders for this visit:     See HPI for visit history    Patient presents with multiple complaints. Unclear etiology. Not sure if all of them are due to the same thing or different things    1. Pain in both lower extremities  -     METABOLIC PANEL, COMPREHENSIVE; Future  -     SED RATE (ESR); Future  -     C REACTIVE PROTEIN, QT; Future  -     CK; Future  Has a history of restless leg syndrome on Requip. Reports about 2 weeks ago, he woke up in the middle of the night with severe symptoms that feels like his restless leg symptoms characterized as severe cramping. Since then, symptoms have not resolved. No aggravating or alleviating factors. Symptoms are in his entire leg from his hip to his foot bilaterally. Says he feels like he is done like day at the gym. Pain at its worst is 45 out of 10. Denies any falls, new activities. Normal strength on exam.  Trace pitting edema. No significant tenderness to palpation. Normal muscle tone. We will get labs    2. Cold intolerance  -     CBC WITH AUTOMATED DIFF; Future  -     TSH 3RD GENERATION; Future  -     T4, FREE; Future  Has noted increased cold intolerance. Reports some fatigue but thinks that is related to not sleeping as much. Denies constipation, hair loss, dry skin. Will get labs    3. Swelling of lower extremity  -     PROTEIN/CREATININE RATIO, URINE; Future  Has noted increased lower extremity edema. Reports today's exam is better than normal.  Denies orthopnea, paroxysmal nocturnal dyspnea. Will get labs. Consider echo. Of note, the rest of his labs are going through [facility] but his urine test is not    4. Nausea  -     ondansetron (ZOFRAN ODT) 4 mg disintegrating tablet; Take 1 Tab by mouth every eight (8) hours as needed for Nausea or Vomiting. Reports some episodes of nausea, lightheadedness, and feeling \"out of sorts\" that will last for a few hours. Not doing anything in particular at that time. No chest pain, shortness of breath. \"I know something is wrong but it is hard to describe. \"  No abdominal pain, vomiting. Chronic loose bowels but no changes. Recommend increasing fluids. We will get labs. Of note, he is still on terbinafine. 5. Prostate cancer screening  -     PSA W/ REFLX FREE PSA; Future    Benefits, risks, possible drug interactions, and side effects of all new medications were reviewed with the patient. Pt verbalized understanding. Return to clinic: Pending labs    An electronic signature was used to authenticate this note. Maame Marquez MD  [facility]  4/1/2021    No future appointments.      Subjective   Chief Complaint   Leg pain    Genia Johnson is a 48 y.o. male     8/26/20 NP  Early onset alzheimer's - Dr. Holder [facility] [employer]; significant decline x 1 year  RLS - inc to ropinerole 2mg  Migraines - well controlled without meds, watch  Bipolar  Depression  ADHD - Dr. Elda Kaur with psych; adderall 27, lexapro 5, risperidal 1  Hypothyroidism   Chronic allergic sinusitis - recently had surgery with Dr Chema Soto ENT; [facility] Cabrera  GERD - pantoprazole; failed trial of coming off meds previously  HLD - rosuvastatin  Dry eyes - restasis; reported negative sjogren testing in the past    9/29/20  PREP counseling - interested in pursuing a sexual relationship with a male partner; Truvada  Leg cramping - occurs when stretching; monitor    12/5/20 ER visit for finger laceration    12/23/20  Urinary urgency - x 1 year; nocturia; tamsulosin; UA neg, trial tamsulosin; urology if needed  Oncychomycosis - R foot; terbinafine  PREP - stopped truvada because no longer needed  Objective   Vitals:       Visit Vitals  BP 94/60 (BP 1 Location: Left upper arm, BP Patient Position: Sitting, BP Cuff Size: Adult long)   Pulse 62   Resp 14   Ht 5' 10\" (1.778 m)   Wt 231 lb (104.8 kg)   SpO2 95%   BMI 33.15 kg/m²        Physical Exam  Constitutional:       General: He is not in acute distress. Appearance: He is well-developed. Eyes:      Extraocular Movements: Extraocular movements intact. Conjunctiva/sclera: Conjunctivae normal.   Neck:      Musculoskeletal: Neck supple. Cardiovascular:      Rate and Rhythm: Normal rate and regular rhythm. Pulses: Normal pulses. Heart sounds: No murmur. No friction rub. No gallop. Pulmonary:      Effort: No respiratory distress. Breath sounds: No wheezing, rhonchi or rales. Abdominal:      General: Bowel sounds are normal. There is no distension. Palpations: Abdomen is soft. There is no hepatomegaly, splenomegaly or mass. Tenderness: There is no abdominal tenderness. There is no guarding. Musculoskeletal:      Comments: Normal lower extremity strength. Normal muscle tone. Trace pitting edema noted especially in the left leg. Skin:     General: Skin is warm. Findings: No rash. Neurological:      Mental Status: He is alert. Current Outpatient Medications   Medication Sig    ondansetron (ZOFRAN ODT) 4 mg disintegrating tablet Take 1 Tab by mouth every eight (8) hours as needed for Nausea or Vomiting.  terbinafine HCL (LAMISIL) 250 mg tablet Take 1 Tab by mouth daily. Indications: onychomycosis    tamsulosin (Flomax) 0.4 mg capsule Take 1 Cap by mouth daily.  donepeziL (ARICEPT) 10 mg tablet Take 10 mg by mouth nightly.  sildenafil citrate (VIAGRA) 100 mg tablet Take 1 Tab by mouth as needed for Erectile Dysfunction. Max dose: 1 tab daily    rOPINIRole (REQUIP) 2 mg tablet Take 1 Tab by mouth nightly. Take 1-3hrs before bed  Indications: restless legs syndrome, an extreme discomfort in the calf muscles when sitting or lying down    amphetamine-dextroamphetamine XR (ADDERALL XR) 30 mg XR capsule TAKE 1 CAPSULE BY MOUTH DAILY. FILL ON OR AFTER 8/14    escitalopram oxalate (LEXAPRO) 5 mg tablet Take 5 mg by mouth daily.     levothyroxine (SYNTHROID) 50 mcg tablet TAKE 1 TABLET BY MOUTH EVERY DAY    pantoprazole (PROTONIX) 40 mg tablet TAKE 1 TABLET BY MOUTH EVERY DAY    risperiDONE (RisperDAL) 1 mg tablet 2 mg.  rosuvastatin (CRESTOR) 20 mg tablet TAKE 1 TABLET BY MOUTH EVERY DAY    Multivitamins with Fluoride (MULTI-VITAMIN PO) Take  by mouth.  cholecalciferol (VITAMIN D3) (1000 Units /25 mcg) tablet Take  by mouth daily.  Lactobac no.41/Bifidobact no.7 (PROBIOTIC-10 PO) Take  by mouth.  MAGNESIUM PO Take 200 mg by mouth.  BIOTIN PO Take  by mouth.  cycloSPORINE (Restasis) 0.05 % dpet Administer 1 Drop to both eyes every twelve (12) hours. No current facility-administered medications for this visit.

## 2021-04-01 ENCOUNTER — OFFICE VISIT (OUTPATIENT)
Dept: INTERNAL MEDICINE CLINIC | Age: 56
End: 2021-04-01
Payer: COMMERCIAL

## 2021-04-01 VITALS
BODY MASS INDEX: 33.07 KG/M2 | SYSTOLIC BLOOD PRESSURE: 94 MMHG | RESPIRATION RATE: 14 BRPM | DIASTOLIC BLOOD PRESSURE: 60 MMHG | HEIGHT: 70 IN | OXYGEN SATURATION: 95 % | WEIGHT: 231 LBS | HEART RATE: 62 BPM

## 2021-04-01 DIAGNOSIS — M79.605 PAIN IN BOTH LOWER EXTREMITIES: Primary | ICD-10-CM

## 2021-04-01 DIAGNOSIS — R68.89 COLD INTOLERANCE: ICD-10-CM

## 2021-04-01 DIAGNOSIS — M79.604 PAIN IN BOTH LOWER EXTREMITIES: Primary | ICD-10-CM

## 2021-04-01 DIAGNOSIS — R11.0 NAUSEA: ICD-10-CM

## 2021-04-01 DIAGNOSIS — M79.89 SWELLING OF LOWER EXTREMITY: ICD-10-CM

## 2021-04-01 DIAGNOSIS — Z12.5 PROSTATE CANCER SCREENING: ICD-10-CM

## 2021-04-01 PROCEDURE — 99214 OFFICE O/P EST MOD 30 MIN: CPT | Performed by: INTERNAL MEDICINE

## 2021-04-01 RX ORDER — ONDANSETRON 4 MG/1
4 TABLET, ORALLY DISINTEGRATING ORAL
Qty: 15 TAB | Refills: 1 | Status: SHIPPED | OUTPATIENT
Start: 2021-04-01

## 2021-04-02 LAB
ALBUMIN SERPL-MCNC: 4.2 G/DL (ref 3.8–4.9)
ALBUMIN/GLOB SERPL: 2.1 {RATIO} (ref 1.2–2.2)
ALP SERPL-CCNC: 56 IU/L (ref 39–117)
ALT SERPL-CCNC: 14 IU/L (ref 0–44)
AST SERPL-CCNC: 25 IU/L (ref 0–40)
BASOPHILS # BLD AUTO: 0.1 X10E3/UL (ref 0–0.2)
BASOPHILS NFR BLD AUTO: 1 %
BILIRUB SERPL-MCNC: <0.2 MG/DL (ref 0–1.2)
BUN SERPL-MCNC: 15 MG/DL (ref 6–24)
BUN/CREAT SERPL: 11 (ref 9–20)
CALCIUM SERPL-MCNC: 9.8 MG/DL (ref 8.7–10.2)
CHLORIDE SERPL-SCNC: 103 MMOL/L (ref 96–106)
CK SERPL-CCNC: 260 U/L (ref 41–331)
CO2 SERPL-SCNC: 28 MMOL/L (ref 20–29)
CREAT SERPL-MCNC: 1.42 MG/DL (ref 0.76–1.27)
CREAT UR-MCNC: 289.8 MG/DL
CRP SERPL-MCNC: <1 MG/L (ref 0–10)
EOSINOPHIL # BLD AUTO: 0.2 X10E3/UL (ref 0–0.4)
EOSINOPHIL NFR BLD AUTO: 3 %
ERYTHROCYTE [DISTWIDTH] IN BLOOD BY AUTOMATED COUNT: 13.6 % (ref 11.6–15.4)
ERYTHROCYTE [SEDIMENTATION RATE] IN BLOOD BY WESTERGREN METHOD: 2 MM/HR (ref 0–30)
GLOBULIN SER CALC-MCNC: 2 G/DL (ref 1.5–4.5)
GLUCOSE SERPL-MCNC: 91 MG/DL (ref 65–99)
HCT VFR BLD AUTO: 45.2 % (ref 37.5–51)
HGB BLD-MCNC: 14.3 G/DL (ref 13–17.7)
IMM GRANULOCYTES # BLD AUTO: 0 X10E3/UL (ref 0–0.1)
IMM GRANULOCYTES NFR BLD AUTO: 0 %
INTERPRETATION: NORMAL
LYMPHOCYTES # BLD AUTO: 2.3 X10E3/UL (ref 0.7–3.1)
LYMPHOCYTES NFR BLD AUTO: 29 %
MCH RBC QN AUTO: 29.3 PG (ref 26.6–33)
MCHC RBC AUTO-ENTMCNC: 31.6 G/DL (ref 31.5–35.7)
MCV RBC AUTO: 93 FL (ref 79–97)
MONOCYTES # BLD AUTO: 0.9 X10E3/UL (ref 0.1–0.9)
MONOCYTES NFR BLD AUTO: 12 %
NEUTROPHILS # BLD AUTO: 4.4 X10E3/UL (ref 1.4–7)
NEUTROPHILS NFR BLD AUTO: 55 %
PLATELET # BLD AUTO: 183 X10E3/UL (ref 150–450)
POTASSIUM SERPL-SCNC: 4.1 MMOL/L (ref 3.5–5.2)
PROT SERPL-MCNC: 6.2 G/DL (ref 6–8.5)
PROT UR-MCNC: 17.1 MG/DL
PROT/CREAT UR: 59 MG/G CREAT (ref 0–200)
PSA SERPL-MCNC: 1.6 NG/ML (ref 0–4)
RBC # BLD AUTO: 4.88 X10E6/UL (ref 4.14–5.8)
REFLEX CRITERIA: NORMAL
SODIUM SERPL-SCNC: 143 MMOL/L (ref 134–144)
T4 FREE SERPL-MCNC: 1.29 NG/DL (ref 0.82–1.77)
TSH SERPL DL<=0.005 MIU/L-ACNC: 5.91 UIU/ML (ref 0.45–4.5)
WBC # BLD AUTO: 7.9 X10E3/UL (ref 3.4–10.8)

## 2021-04-05 DIAGNOSIS — M79.89 SWELLING OF LOWER EXTREMITY: Primary | ICD-10-CM

## 2021-04-05 DIAGNOSIS — R94.6 BORDERLINE ABNORMAL TFTS: ICD-10-CM

## 2021-04-05 NOTE — PROGRESS NOTES
[email or web address] message sent. LOOK UP CBC normal.  Creatinine 1.42 from 1.2. CMP otherwise normal.  TSH 5.9. Normal free T4. PSA normal.  ESR and CRP normal.  CK normal.  Urine protein normal.  =  Your test results are normal except for the following: Your labs did not provide any explanation for your swelling. I recommend getting an echocardiogram (ultrasound) of your heart to make sure it's not your heart causing your swelling. Please call [phone] to schedule. One of your thyroid tests came back abnormal, but the other one was normal.  I recommend repeating this test in 3 months to see if anything has changed. I'm not sure why you're having so much pain in your legs. We could try increasing your requip to 3mg daily (1.5 tablets) to see if that helps with the pain. Let's plan to follow up in clinic again after your echo so we can discuss next steps.

## 2021-04-16 ENCOUNTER — HOSPITAL ENCOUNTER (OUTPATIENT)
Dept: NON INVASIVE DIAGNOSTICS | Age: 56
Discharge: HOME | End: 2021-04-16
Attending: INTERNAL MEDICINE
Payer: COMMERCIAL

## 2021-04-16 VITALS
HEIGHT: 70 IN | BODY MASS INDEX: 33.08 KG/M2 | DIASTOLIC BLOOD PRESSURE: 74 MMHG | WEIGHT: 231.04 LBS | SYSTOLIC BLOOD PRESSURE: 124 MMHG

## 2021-04-16 DIAGNOSIS — M79.89 SWELLING OF LOWER EXTREMITY: ICD-10-CM

## 2021-04-16 LAB
ECHO AO ASC DIAM: 3.39 CM
ECHO AO ROOT DIAM: 3.11 CM
ECHO AV AREA PEAK VELOCITY: 2.86 CM2
ECHO AV AREA/BSA PEAK VELOCITY: 1.3 CM2/M2
ECHO AV PEAK GRADIENT: 9.6 MMHG
ECHO AV PEAK VELOCITY: 154.92 CM/S
ECHO IVC PROX: 1.29 CM
ECHO LA AREA 4C: 16.58 CM2
ECHO LA MAJOR AXIS: 3.91 CM
ECHO LA MINOR AXIS: 1.76 CM
ECHO LA VOL 2C: 41.64 ML (ref 18–58)
ECHO LA VOL 4C: 39.57 ML (ref 18–58)
ECHO LA VOL BP: 45.55 ML (ref 18–58)
ECHO LA VOL/BSA BIPLANE: 20.52 ML/M2 (ref 16–28)
ECHO LA VOLUME INDEX A2C: 18.76 ML/M2 (ref 16–28)
ECHO LA VOLUME INDEX A4C: 17.83 ML/M2 (ref 16–28)
ECHO LV E' LATERAL VELOCITY: 14.28 CENTIMETER/SECOND
ECHO LV E' SEPTAL VELOCITY: 9.73 CENTIMETER/SECOND
ECHO LV INTERNAL DIMENSION DIASTOLIC: 4.35 CM (ref 4.2–5.9)
ECHO LV INTERNAL DIMENSION SYSTOLIC: 2.77 CM
ECHO LV IVSD: 0.61 CM (ref 0.6–1)
ECHO LV MASS 2D: 74.3 G (ref 88–224)
ECHO LV MASS INDEX 2D: 33.5 G/M2 (ref 49–115)
ECHO LV POSTERIOR WALL DIASTOLIC: 0.59 CM (ref 0.6–1)
ECHO LVOT DIAM: 2.16 CM
ECHO LVOT PEAK GRADIENT: 5.89 MMHG
ECHO LVOT PEAK VELOCITY: 121.34 CM/S
ECHO MV A VELOCITY: 71.53 CENTIMETER/SECOND
ECHO MV AREA PHT: 2.88 CM2
ECHO MV E DECELERATION TIME (DT): 263.45 MS
ECHO MV E VELOCITY: 71.53 CENTIMETER/SECOND
ECHO MV PRESSURE HALF TIME (PHT): 76.4 MS
ECHO PV MAX VELOCITY: 101.33 CM/S
ECHO PV PEAK INSTANTANEOUS GRADIENT SYSTOLIC: 4.12 MMHG
ECHO RV INTERNAL DIMENSION: 4.44 CM
ECHO RV TAPSE: 2.57 CM (ref 1.5–2)
ECHO TV REGURGITANT MAX VELOCITY: 262.86 CM/S
ECHO TV REGURGITANT PEAK GRADIENT: 27.64 MMHG
LA VOL DISK BP: 41.36 ML (ref 18–58)

## 2021-04-16 PROCEDURE — 93306 TTE W/DOPPLER COMPLETE: CPT

## 2021-04-16 NOTE — PROGRESS NOTES
Addendum   04/16/21   [employer] message sent. LV: Estimated LVEF is 60 - 65%. Normal cavity size, wall thickness, systolic function (ejection fraction normal) and diastolic function. Wall motion: normal.  =  Your echo is normal.  Your heart is pumping like we expect, so that is likely not the cause of your swelling. I recommend coming back to clinic to follow up on your symptoms and decide next steps. If you are ready to make an appointment, please send us a message with available times and dates, and my nurse can help get that appointment scheduled for you.

## 2021-04-16 NOTE — PROGRESS NOTES
SaySwap message sent. LV: Estimated LVEF is 60 - 65%. Normal cavity size, wall thickness, systolic function (ejection fraction normal) and diastolic function. Wall motion: normal.  =  Your echo is normal.  Your heart is pumping like we expect, so that is likely not the cause of your swelling. I recommend coming back to clinic to follow up on your symptoms and decide next steps. If you are ready to make an appointment, please send us a message with available times and dates, and my nurse can help get that appointment scheduled for you.

## 2021-05-08 DIAGNOSIS — R39.15 URINARY URGENCY: ICD-10-CM

## 2021-05-10 RX ORDER — TAMSULOSIN HYDROCHLORIDE 0.4 MG/1
CAPSULE ORAL
Qty: 30 CAP | Refills: 3 | Status: SHIPPED | OUTPATIENT
Start: 2021-05-10 | End: 2021-09-28

## 2021-05-31 DIAGNOSIS — N52.9 ERECTILE DYSFUNCTION, UNSPECIFIED ERECTILE DYSFUNCTION TYPE: ICD-10-CM

## 2021-05-31 RX ORDER — SILDENAFIL 100 MG/1
TABLET, FILM COATED ORAL
Qty: 30 TABLET | Refills: 5 | Status: SHIPPED | OUTPATIENT
Start: 2021-05-31 | End: 2022-07-01

## 2021-06-27 DIAGNOSIS — G25.81 RESTLESS LEG SYNDROME: ICD-10-CM

## 2021-06-28 RX ORDER — ROPINIROLE 2 MG/1
TABLET, FILM COATED ORAL
Qty: 90 TABLET | Refills: 3 | Status: SHIPPED | OUTPATIENT
Start: 2021-06-28 | End: 2021-08-05 | Stop reason: SDUPTHER

## 2021-07-29 DIAGNOSIS — G25.81 RESTLESS LEG SYNDROME: ICD-10-CM

## 2021-07-29 NOTE — TELEPHONE ENCOUNTER
----- Message from Sofiya Jiang sent at 7/29/2021  1:07 PM EDT -----  Regarding: /refill  Contact: [phone]  Medication Refill    Caller (if not patient): N/A      Relationship of caller (if not patient): N/A      Best contact number(s): [phone]      Name of medication and dosage if known:rOPINIRole      Is patient out of this medication (yes/no):  No      Pharmacy name: [facility] in [place] [id number] listed in chart? (yes/no): Yes  Pharmacy phone number: unknown      Details to clarify the request: Needs the 3mg called in      Sofiya Jiang

## 2021-07-30 NOTE — TELEPHONE ENCOUNTER
Call made to patient; mo answer-- lvm to advise for clarification of mg for requid rx. Advised for a call back.

## 2021-08-05 DIAGNOSIS — G25.81 RESTLESS LEG SYNDROME: ICD-10-CM

## 2021-08-05 NOTE — TELEPHONE ENCOUNTER
Pt walked into the office requesting a refill for ropinirole and he verified it is for 3 mg per a discussion at his last visit.        Thank you

## 2021-08-09 RX ORDER — ROPINIROLE 2 MG/1
TABLET, FILM COATED ORAL
Qty: 90 TABLET | Refills: 3 | OUTPATIENT
Start: 2021-08-09

## 2021-08-09 RX ORDER — ROPINIROLE 2 MG/1
3 TABLET, FILM COATED ORAL
Qty: 135 TABLET | Refills: 1 | Status: SHIPPED | OUTPATIENT
Start: 2021-08-09 | End: 2021-08-17 | Stop reason: DRUGHIGH

## 2021-08-17 ENCOUNTER — OFFICE VISIT (OUTPATIENT)
Dept: INTERNAL MEDICINE CLINIC | Age: 56
End: 2021-08-17
Payer: COMMERCIAL

## 2021-08-17 VITALS
TEMPERATURE: 98.1 F | DIASTOLIC BLOOD PRESSURE: 70 MMHG | HEIGHT: 70 IN | RESPIRATION RATE: 16 BRPM | SYSTOLIC BLOOD PRESSURE: 106 MMHG | HEART RATE: 66 BPM | WEIGHT: 237 LBS | BODY MASS INDEX: 33.93 KG/M2 | OXYGEN SATURATION: 95 %

## 2021-08-17 DIAGNOSIS — E03.9 ACQUIRED HYPOTHYROIDISM: ICD-10-CM

## 2021-08-17 DIAGNOSIS — M25.512 CHRONIC LEFT SHOULDER PAIN: Primary | ICD-10-CM

## 2021-08-17 DIAGNOSIS — G25.81 RESTLESS LEG SYNDROME: ICD-10-CM

## 2021-08-17 DIAGNOSIS — G89.29 CHRONIC LEFT SHOULDER PAIN: Primary | ICD-10-CM

## 2021-08-17 DIAGNOSIS — Z79.899 ON PRE-EXPOSURE PROPHYLAXIS FOR HIV: ICD-10-CM

## 2021-08-17 DIAGNOSIS — M79.89 LEG SWELLING: ICD-10-CM

## 2021-08-17 PROBLEM — G43.909 MIGRAINES: Status: ACTIVE | Noted: 2020-08-26

## 2021-08-17 PROBLEM — R39.15 URINARY URGENCY: Status: ACTIVE | Noted: 2021-08-17

## 2021-08-17 PROBLEM — B35.1 ONYCHOMYCOSIS: Status: ACTIVE | Noted: 2021-08-17

## 2021-08-17 PROCEDURE — 99214 OFFICE O/P EST MOD 30 MIN: CPT | Performed by: INTERNAL MEDICINE

## 2021-08-17 RX ORDER — TRAZODONE HYDROCHLORIDE 50 MG/1
100 TABLET ORAL
COMMUNITY
Start: 2021-06-16 | End: 2022-01-12 | Stop reason: SDUPTHER

## 2021-08-17 RX ORDER — ROPINIROLE 3 MG/1
3 TABLET, FILM COATED ORAL
Qty: 90 TABLET | Refills: 3 | Status: SHIPPED | OUTPATIENT
Start: 2021-08-17 | End: 2022-09-12

## 2021-08-17 RX ORDER — CARIPRAZINE 3 MG/1
CAPSULE, GELATIN COATED ORAL
COMMUNITY
Start: 2021-08-13 | End: 2022-01-12 | Stop reason: SDUPTHER

## 2021-08-17 NOTE — ASSESSMENT & PLAN NOTE
Still has some trace lower extremity edema. Does not bother him enough to want medications for it.  Previous echo normal.  Urine protein normal.  LFTs normal.  Continue to monitor

## 2021-08-17 NOTE — ASSESSMENT & PLAN NOTE
8/26/20 -inc to ropinerole 2mg  ====  Symptoms better with Requip 3 mg.   Continue, no changes recommended

## 2021-08-17 NOTE — PROGRESS NOTES
Identified pt with two pt identifiers(name and ). Chief Complaint   Patient presents with    Tendonitis     in left shoulder x1.5mo ago      Vitals:    21 1125   BP: 106/70   Pulse: 66   Resp: 16   Temp: 98.1 °F (36.7 °C)   TempSrc: Temporal   SpO2: 95%   Weight: 237 lb (107.5 kg)   Height: 5' 10\" (1.778 m)   PainSc:   0 - No pain        Health Maintenance Due   Topic    Colorectal Cancer Screening Combo        Depression Screening:  :     3 most recent PHQ Screens 2021   Little interest or pleasure in doing things Not at all   Feeling down, depressed, irritable, or hopeless Not at all   Total Score PHQ 2 0        Abuse Screening:  :     Abuse Screening Questionnaire 2021   Do you ever feel afraid of your partner? N   Are you in a relationship with someone who physically or mentally threatens you? N   Is it safe for you to go home? Y        Coordination of Care Questionnaire:  :     1. Have you been to the ER, urgent care clinic since your last visit? Hospitalized since your last visit? No    2. Have you seen or consulted any other health care providers outside of the [address] [place] [address] since your last visit? Include any pap smears or colon screening.  No

## 2021-08-17 NOTE — ASSESSMENT & PLAN NOTE
Onset 1-2 months ago. History of right shoulder issues related to a tendon and he feels his left shoulder feels the same way. Pain does improve with Aleve and heating pad. Decreased range of motion. No known injuries but thinks it may be related to sleeping on his shoulder. No numbness, tingling.   Recommend referral to orthopedics for possible injection

## 2021-08-17 NOTE — PROGRESS NOTES
Note   Chief Complaint   Shoulder pain    Eliezer Paez is a 48 y.o. male     1. Chronic left shoulder pain  Assessment & Plan: Onset 1-2 months ago. History of right shoulder issues related to a tendon and he feels his left shoulder feels the same way. Pain does improve with Aleve and heating pad. Decreased range of motion. No known injuries but thinks it may be related to sleeping on his shoulder. No numbness, tingling. Recommend referral to orthopedics for possible injection  Orders:  -     REFERRAL TO ORTHOPEDICS  2. On pre-exposure prophylaxis for HIV  Assessment & Plan:  Previously started on Truvada for prep last November but subsequently stopped because it was no longer needed. He is interested in restarting. We discussed common side effects and the need to be adherent to his medications. We discussed that he needs HIV testing every 3 months prior to each prescription. He is agreeable to this testing and is also agreeable to testing for other STDs at the same time.     Pt was provided counseling on condom use (recommend consistent use but for at least 7 days after initiation of tx if engaging in receptive anal sex and for at least 21 days after initiation of tx if engaging in receptive vaginal sex), risk reduction, PrEP medication adherence, monitoring for acute HIV sx (lymphadenopathy, fever, malaise, maculopapular eruption)    STD testing ordered including HIV. Patient provided verbal permission for HIV testing. Will need testing prior to starting Truvada  Orders:  -     CT/NG/T.VAGINALIS AMPLIFICATION; Future  -     HIV 1/2 AG/AB, 4TH GENERATION,W RFLX CONFIRM; Future  -     T PALLIDUM SCREEN W/REFLEX; Future  -     HEPATITIS C AB; Future  -     HEP B SURFACE AG; Future  -     HBV CORE AB, IGG/IGM; Future  -     HEP B SURFACE AB; Future  -     METABOLIC PANEL, BASIC; Future  3. Restless leg syndrome  Assessment & Plan:  8/26/20 -inc to ropinerole 2mg  ====  Symptoms better with Requip 3 mg. Continue, no changes recommended  Orders:  -     rOPINIRole (REQUIP) 3 mg tab tab; Take 1 Tablet by mouth nightly. Indications: restless legs syndrome, an extreme discomfort in the calf muscles when sitting or lying down, Normal, Disp-90 Tablet, R-3  4. Acquired hypothyroidism  Assessment & Plan:  Check thyroid studies today. Verify dose of levothyroxine  Orders:  -     TSH 3RD GENERATION; Future  -     T4, FREE; Future  5. Leg swelling  Assessment & Plan:  Still has some trace lower extremity edema. Does not bother him enough to want medications for it. Previous echo normal.  Urine protein normal.  LFTs normal.  Continue to monitor       Benefits, risks, possible drug interactions, and side effects of all new medications were reviewed with the patient. Pt verbalized understanding. Return to clinic: 3 months for prep if starting    An electronic signature was used to authenticate this note. Natalya Cole MD  [facility]  8/17/2021    Future Appointments   Date Time Provider Betina Mcintoshi   45/[id number]  5:97 PM Gunjan Astorga MD [facility]        Objective   Vitals:       Visit Vitals  /70 (BP 1 Location: Left arm, BP Patient Position: Sitting, BP Cuff Size: Small adult)   Pulse 66   Temp 98.1 °F (36.7 °C) (Temporal)   Resp 16   Ht 5' 10\" (1.778 m)   Wt 237 lb (107.5 kg)   SpO2 95%   BMI 34.01 kg/m²        Physical Exam  Constitutional:       Appearance: Normal appearance. He is not ill-appearing. Cardiovascular:      Rate and Rhythm: Normal rate and regular rhythm. Heart sounds: No murmur heard. No friction rub. No gallop. Pulmonary:      Effort: No respiratory distress. Breath sounds: Normal breath sounds. No wheezing, rhonchi or rales.    Musculoskeletal:      Comments: Decreased active range of motion of left shoulder especially extension, external rotation, internal rotation, and abduction above the horizontal.  Mild tenderness to palpation of anterior shoulder   Neurological:      Mental Status: He is alert. Current Outpatient Medications   Medication Sig    Vraylar 3 mg capsule     traZODone (DESYREL) 50 mg tablet TAKE ONE TO 1 1/2 TABLETS BY MOUTH AT BEDTIME AS NEEDED    rOPINIRole (REQUIP) 3 mg tab tab Take 1 Tablet by mouth nightly. Indications: restless legs syndrome, an extreme discomfort in the calf muscles when sitting or lying down    sildenafil citrate (VIAGRA) 100 mg tablet TAKE 1 TABLET AS NEEDED FOR ERECTILE DYSFUNCTION. MAX DAILY DOSE 1 TABLET    tamsulosin (FLOMAX) 0.4 mg capsule TAKE 1 CAPSULE BY MOUTH EVERY DAY    ondansetron (ZOFRAN ODT) 4 mg disintegrating tablet Take 1 Tab by mouth every eight (8) hours as needed for Nausea or Vomiting.  donepeziL (ARICEPT) 10 mg tablet Take 10 mg by mouth nightly.  amphetamine-dextroamphetamine XR (ADDERALL XR) 30 mg XR capsule TAKE 1 CAPSULE BY MOUTH DAILY. FILL ON OR AFTER 8/14    escitalopram oxalate (LEXAPRO) 5 mg tablet Take 5 mg by mouth daily.  levothyroxine (SYNTHROID) 50 mcg tablet TAKE 1 TABLET BY MOUTH EVERY DAY    pantoprazole (PROTONIX) 40 mg tablet TAKE 1 TABLET BY MOUTH EVERY DAY    risperiDONE (RisperDAL) 1 mg tablet 2 mg.  rosuvastatin (CRESTOR) 20 mg tablet TAKE 1 TABLET BY MOUTH EVERY DAY    Multivitamins with Fluoride (MULTI-VITAMIN PO) Take  by mouth.  cholecalciferol (VITAMIN D3) (1000 Units /25 mcg) tablet Take  by mouth daily.  Lactobac no.41/Bifidobact no.7 (PROBIOTIC-10 PO) Take  by mouth.  MAGNESIUM PO Take 200 mg by mouth.  BIOTIN PO Take  by mouth.  cycloSPORINE (Restasis) 0.05 % dpet Administer 1 Drop to both eyes every twelve (12) hours. No current facility-administered medications for this visit.

## 2021-08-17 NOTE — ASSESSMENT & PLAN NOTE
Previously started on Truvada for prep last September but subsequently stopped because it was no longer needed. He is interested in restarting. We discussed common side effects and the need to be adherent to his medications. We discussed that he needs HIV testing every 3 months prior to each prescription. He is agreeable to this testing and is also agreeable to testing for other STDs at the same time.     Pt was provided counseling on condom use (recommend consistent use but for at least 7 days after initiation of tx if engaging in receptive anal sex and for at least 21 days after initiation of tx if engaging in receptive vaginal sex), risk reduction, PrEP medication adherence, monitoring for acute HIV sx (lymphadenopathy, fever, malaise, maculopapular eruption)    STD testing ordered including HIV. Patient provided verbal permission for HIV testing.   Will need testing prior to starting Truvada

## 2021-08-21 LAB
BUN SERPL-MCNC: 16 MG/DL (ref 6–24)
BUN/CREAT SERPL: 11 (ref 9–20)
C TRACH RRNA SPEC QL NAA+PROBE: NEGATIVE
CALCIUM SERPL-MCNC: 9.7 MG/DL (ref 8.7–10.2)
CHLORIDE SERPL-SCNC: 103 MMOL/L (ref 96–106)
CO2 SERPL-SCNC: 26 MMOL/L (ref 20–29)
CREAT SERPL-MCNC: 1.5 MG/DL (ref 0.76–1.27)
GLUCOSE SERPL-MCNC: 99 MG/DL (ref 65–99)
HBV CORE AB SERPL QL IA: NEGATIVE
HBV CORE IGM SERPL QL IA: NEGATIVE
HBV SURFACE AB SER QL: NON REACTIVE
HBV SURFACE AG SERPL QL IA: NEGATIVE
HCV AB S/CO SERPL IA: <0.1 S/CO RATIO (ref 0–0.9)
HIV 1+2 AB+HIV1 P24 AG SERPL QL IA: NON REACTIVE
INTERPRETATION: NORMAL
N GONORRHOEA RRNA SPEC QL NAA+PROBE: NEGATIVE
POTASSIUM SERPL-SCNC: 4.2 MMOL/L (ref 3.5–5.2)
SODIUM SERPL-SCNC: 142 MMOL/L (ref 134–144)
T VAGINALIS DNA SPEC QL NAA+PROBE: NEGATIVE
T4 FREE SERPL-MCNC: 1.11 NG/DL (ref 0.82–1.77)
TREPONEMA PALLIDUM IGG+IGM AB [PRESENCE] IN SERUM OR PLASMA BY IMMUNOASSAY: NON REACTIVE
TSH SERPL DL<=0.005 MIU/L-ACNC: 6.91 UIU/ML (ref 0.45–4.5)

## 2021-09-02 ENCOUNTER — TELEPHONE (OUTPATIENT)
Dept: INTERNAL MEDICINE CLINIC | Age: 56
End: 2021-09-02

## 2021-09-02 DIAGNOSIS — Z79.899 ON PRE-EXPOSURE PROPHYLAXIS FOR HIV: ICD-10-CM

## 2021-09-02 NOTE — TELEPHONE ENCOUNTER
Called patient to discuss lab results. TSH elevated. Normal free T4. Gonorrhea chlamydia trichomonas negative. HIV negative. Syphilis negative. Hep C negative. Hep B negative. Not immune to hepatitis B. Creatinine 1.5. Was 1.2 a year ago. BMP otherwise normal.    Pt reports taking levothyroxine appropriately. Recommend increasing to 75 mcg daily. Prescription sent to pharmacy. Creatinine slightly higher compared to last year. He had been using a lot of NSAIDs due to his shoulder pain. Saw orthopedics yesterday and got a steroid injection which has been helping. Discussed risk of renal dysfunction with Truvada. Discussed starting Truvada now versus stopping his NSAIDs and rechecking his kidney function in a couple weeks then deciding on starting Truvada. Discussed need for repeat HIV test if high risk exposure between last test and starting Truvada if waiting. Plan to stop NSAIDs and recheck BMP in a couple weeks.   Consider starting Truvada after (renal dosing cutoff GFR 50)

## 2021-09-02 NOTE — PROGRESS NOTES
Discussed over the phone; see encounter for details  Repeat BMP, hold off on truvada until check  Inc levothyroxine to 75mcg  Nonresponder to previous HBV vaccination series x 2

## 2021-09-28 DIAGNOSIS — R39.15 URINARY URGENCY: ICD-10-CM

## 2021-09-28 RX ORDER — TAMSULOSIN HYDROCHLORIDE 0.4 MG/1
CAPSULE ORAL
Qty: 30 CAPSULE | Refills: 5 | Status: SHIPPED | OUTPATIENT
Start: 2021-09-28 | End: 2022-04-15

## 2021-10-01 DIAGNOSIS — E03.9 ACQUIRED HYPOTHYROIDISM: ICD-10-CM

## 2021-10-03 RX ORDER — LEVOTHYROXINE SODIUM 75 UG/1
TABLET ORAL
Qty: 30 TABLET | Refills: 1 | Status: SHIPPED | OUTPATIENT
Start: 2021-10-03 | End: 2021-11-08

## 2021-11-07 DIAGNOSIS — E03.9 ACQUIRED HYPOTHYROIDISM: ICD-10-CM

## 2021-11-08 RX ORDER — LEVOTHYROXINE SODIUM 75 UG/1
TABLET ORAL
Qty: 30 TABLET | Refills: 1 | Status: SHIPPED | OUTPATIENT
Start: 2021-11-08 | End: 2022-01-03

## 2021-11-10 DIAGNOSIS — F98.8 ATTENTION DEFICIT DISORDER, UNSPECIFIED HYPERACTIVITY PRESENCE: Primary | ICD-10-CM

## 2021-11-10 RX ORDER — DEXTROAMPHETAMINE SACCHARATE, AMPHETAMINE ASPARTATE, DEXTROAMPHETAMINE SULFATE AND AMPHETAMINE SULFATE 2.5; 2.5; 2.5; 2.5 MG/1; MG/1; MG/1; MG/1
10 TABLET ORAL DAILY
Qty: 30 TABLET | Refills: 0 | Status: SHIPPED | OUTPATIENT
Start: 2021-11-10 | End: 2022-01-12 | Stop reason: SDUPTHER

## 2021-11-10 RX ORDER — DEXTROAMPHETAMINE SACCHARATE, AMPHETAMINE ASPARTATE MONOHYDRATE, DEXTROAMPHETAMINE SULFATE AND AMPHETAMINE SULFATE 7.5; 7.5; 7.5; 7.5 MG/1; MG/1; MG/1; MG/1
30 CAPSULE, EXTENDED RELEASE ORAL
Qty: 30 CAPSULE | Refills: 0 | Status: SHIPPED | OUTPATIENT
Start: 2021-11-10 | End: 2022-01-12 | Stop reason: SDUPTHER

## 2021-11-19 ENCOUNTER — OFFICE VISIT (OUTPATIENT)
Dept: INTERNAL MEDICINE CLINIC | Age: 56
End: 2021-11-19
Payer: COMMERCIAL

## 2021-11-19 VITALS
HEART RATE: 74 BPM | WEIGHT: 242 LBS | SYSTOLIC BLOOD PRESSURE: 113 MMHG | HEIGHT: 70 IN | RESPIRATION RATE: 14 BRPM | OXYGEN SATURATION: 93 % | TEMPERATURE: 97.9 F | DIASTOLIC BLOOD PRESSURE: 71 MMHG | BODY MASS INDEX: 34.65 KG/M2

## 2021-11-19 DIAGNOSIS — M25.512 CHRONIC LEFT SHOULDER PAIN: ICD-10-CM

## 2021-11-19 DIAGNOSIS — G89.29 CHRONIC LEFT SHOULDER PAIN: ICD-10-CM

## 2021-11-19 DIAGNOSIS — N18.31 STAGE 3A CHRONIC KIDNEY DISEASE (HCC): ICD-10-CM

## 2021-11-19 DIAGNOSIS — G30.0 EARLY ONSET ALZHEIMER'S DEMENTIA WITHOUT BEHAVIORAL DISTURBANCE (HCC): ICD-10-CM

## 2021-11-19 DIAGNOSIS — K21.9 GASTROESOPHAGEAL REFLUX DISEASE WITHOUT ESOPHAGITIS: ICD-10-CM

## 2021-11-19 DIAGNOSIS — N40.1 BENIGN PROSTATIC HYPERPLASIA WITH URINARY FREQUENCY: ICD-10-CM

## 2021-11-19 DIAGNOSIS — G25.81 RESTLESS LEG SYNDROME: ICD-10-CM

## 2021-11-19 DIAGNOSIS — E78.5 HYPERLIPIDEMIA, UNSPECIFIED HYPERLIPIDEMIA TYPE: ICD-10-CM

## 2021-11-19 DIAGNOSIS — E03.9 ACQUIRED HYPOTHYROIDISM: ICD-10-CM

## 2021-11-19 DIAGNOSIS — R73.03 PREDIABETES: ICD-10-CM

## 2021-11-19 DIAGNOSIS — Z79.899 ON PRE-EXPOSURE PROPHYLAXIS FOR HIV: ICD-10-CM

## 2021-11-19 DIAGNOSIS — R35.0 BENIGN PROSTATIC HYPERPLASIA WITH URINARY FREQUENCY: ICD-10-CM

## 2021-11-19 DIAGNOSIS — F02.80 EARLY ONSET ALZHEIMER'S DEMENTIA WITHOUT BEHAVIORAL DISTURBANCE (HCC): ICD-10-CM

## 2021-11-19 DIAGNOSIS — Z00.00 WELL ADULT EXAM: Primary | ICD-10-CM

## 2021-11-19 DIAGNOSIS — F31.9 BIPOLAR AFFECTIVE DISORDER, REMISSION STATUS UNSPECIFIED (HCC): ICD-10-CM

## 2021-11-19 PROCEDURE — 99396 PREV VISIT EST AGE 40-64: CPT | Performed by: INTERNAL MEDICINE

## 2021-11-19 NOTE — PROGRESS NOTES
Assessment and Plan     1. Well adult exam  Assessment & Plan:  Had a colonoscopy 3 years ago prior to moving here. Was told it was normal and due in 10 years. Encourage healthy habits  2. On pre-exposure prophylaxis for HIV  Assessment & Plan:  Kidney function was mildly elevated last visit. Possibly related to NSAID use at that time. We discussed repeating kidney function and if better, then starting Truvada. He has had 1 high risk exposure since last visit so we will recheck HIV. HIV test today. If kidney function better, we discussed starting Truvada. Advised he will need testing every 3 months. Renal dosing cut off GFR 50  Provided verbal permission for HIV testing  Orders:  -     HIV 1/2 AG/AB, 4TH GENERATION,W RFLX CONFIRM; Future  3. Acquired hypothyroidism  Assessment & Plan:  TSH high last visit. Increase dose to 75 mcg. Still feels cold. Check TSH today, otherwise continue levothyroxine 75 mcg  Orders:  -     TSH 3RD GENERATION; Future  4. Stage 3a chronic kidney disease (HCC)  -     METABOLIC PANEL, BASIC; Future  5. Prediabetes  -     HEMOGLOBIN A1C WITH EAG; Future  6. Hyperlipidemia, unspecified hyperlipidemia type  -     LIPID PANEL; Future  7. Benign prostatic hyperplasia with urinary frequency  Assessment & Plan:  Still has occl urgency   flomax working for the most part   Dependent on caffeine intake  Continue tamsulosin, no changes recommended  8. Restless leg syndrome  Assessment & Plan:  Well-controlled with Requip 3 mg daily. Continue, no changes recommended  9. Gastroesophageal reflux disease without esophagitis  Assessment & Plan:  Pantoprazole 40 mg daily working well. Continue, no changes recommended  10. Early onset Alzheimer's dementia without behavioral disturbance [facility])  Assessment & Plan:  Continues to follow with neurology. On donepezil. 11. Bipolar affective disorder, remission status unspecified ([facility] [address]  Assessment & Plan:  Continues to follow-up with Dr. Shaye Mckinney. on Adderall, Lexapro, Risperdal, trazodone, Vraylar   12. Chronic left shoulder pain  Assessment & Plan:  Got an injection with orthopedics       Benefits, risks, possible drug interactions, and side effects of all new medications were reviewed with the patient. Pt verbalized understanding. Return to clinic: 3 months if starting Truvada    An electronic signature was used to authenticate this note. Dominguez Phillips MD  [facility]  11/19/2021    No future appointments. History of Present Illness   Chief Complaint   Establish care    Brandie Contreras is a 55 y.o. male         Review of Systems   Constitutional: Negative for chills and fever. HENT: Negative for hearing loss. Eyes: Negative for blurred vision. Respiratory: Negative for shortness of breath. Cardiovascular: Negative for chest pain. Gastrointestinal: Negative for abdominal pain, blood in stool, constipation, diarrhea, melena, nausea and vomiting. Genitourinary: Negative for dysuria and hematuria. Musculoskeletal: Positive for joint pain. Skin: Negative for rash. Neurological: Negative for headaches. Past Medical History     Allergies   Allergen Reactions    Iodinated Contrast Media Hives     Non-contrast IVP Dye      Other Medication Other (comments)     Vitorin causes Headache       Rifampin Other (comments)        Current Outpatient Medications   Medication Sig    amphetamine-dextroamphetamine XR (ADDERALL XR) 30 mg XR capsule Take 1 Capsule by mouth every morning. Max Daily Amount: 30 mg.    dextroamphetamine-amphetamine (ADDERALL) 10 mg tablet Take 1 Tablet by mouth daily. Max Daily Amount: 10 mg.    levothyroxine (SYNTHROID) 75 mcg tablet TAKE 1 TABLET BY MOUTH EVERY DAY    tamsulosin (FLOMAX) 0.4 mg capsule TAKE 1 CAPSULE BY MOUTH EVERY DAY    Vraylar 3 mg capsule     traZODone (DESYREL) 50 mg tablet 100 mg.    rOPINIRole (REQUIP) 3 mg tab tab Take 1 Tablet by mouth nightly. Indications: restless legs syndrome, an extreme discomfort in the calf muscles when sitting or lying down    sildenafil citrate (VIAGRA) 100 mg tablet TAKE 1 TABLET AS NEEDED FOR ERECTILE DYSFUNCTION. MAX DAILY DOSE 1 TABLET    ondansetron (ZOFRAN ODT) 4 mg disintegrating tablet Take 1 Tab by mouth every eight (8) hours as needed for Nausea or Vomiting.  donepeziL (ARICEPT) 10 mg tablet Take 10 mg by mouth nightly.  escitalopram oxalate (LEXAPRO) 5 mg tablet Take 5 mg by mouth daily.  pantoprazole (PROTONIX) 40 mg tablet TAKE 1 TABLET BY MOUTH EVERY DAY    risperiDONE (RisperDAL) 1 mg tablet 2 mg.  rosuvastatin (CRESTOR) 20 mg tablet TAKE 1 TABLET BY MOUTH EVERY DAY    Multivitamins with Fluoride (MULTI-VITAMIN PO) Take  by mouth.  cholecalciferol (VITAMIN D3) (1000 Units /25 mcg) tablet Take  by mouth daily.  Lactobac no.41/Bifidobact no.7 (PROBIOTIC-10 PO) Take  by mouth.  MAGNESIUM PO Take 200 mg by mouth.  BIOTIN PO Take  by mouth.  cycloSPORINE (Restasis) 0.05 % dpet Administer 1 Drop to both eyes every twelve (12) hours. No current facility-administered medications for this visit. Patient Active Problem List   Diagnosis Code    Hyperlipidemia, unspecified hyperlipidemia type E78.5    Obesity (BMI 30-39. 9) E66.9    Acquired hypothyroidism E03.9    Restless leg syndrome G25.81    Attention deficit disorder, unspecified hyperactivity presence F98.8    Mild episode of recurrent major depressive disorder (HCC) F33.0    Bipolar disorder (HCC) F31.9    Chronic allergic rhinitis J30.9    Gastroesophageal reflux disease without esophagitis K21.9    Early onset Alzheimer's dementia without behavioral disturbance ([facility] [address] G30.0, F02.80    Dry eyes H04.123    Migraines G43.909    Vitamin D deficiency E55.9    Prediabetes R73.03    Chronic left shoulder pain M25.512, G89.29    Benign prostatic hyperplasia with urinary frequency N40.1, R35.0    Onychomycosis B35.1  On pre-exposure prophylaxis for HIV Z79.899    Leg swelling M79.89    Well adult exam Z00.00     Past Surgical History:   Procedure Laterality Date    HX APPENDECTOMY      HX CHOLECYSTECTOMY      HX OTHER SURGICAL      Sinus Surgery    HX UROLOGICAL      varicocele repair      Social History     Tobacco Use    Smoking status: Never Smoker    Smokeless tobacco: Never Used   Substance Use Topics    Alcohol use: Yes     Comment: one drink every other week      Family History   Problem Relation Age of Onset   [place] Cancer Mother         breast x2    Bipolar Disorder Father     Stroke Father 68    Cancer Maternal Grandmother     Cancer Paternal Grandmother         ?pancreatic    Thyroid Disease Paternal Grandmother         Physical Exam   Vitals:       Visit Vitals  /71 (BP 1 Location: Left upper arm, BP Patient Position: Sitting, BP Cuff Size: Adult)   Pulse 74   Temp 97.9 °F (36.6 °C) (Temporal)   Resp 14   Ht 5' 10\" (1.778 m)   Wt 242 lb (109.8 kg)   SpO2 93%   BMI 34.72 kg/m²        Physical Exam  Constitutional:       General: He is not in acute distress. Appearance: He is well-developed. HENT:      Right Ear: Tympanic membrane, ear canal and external ear normal.      Left Ear: Tympanic membrane, ear canal and external ear normal.   Eyes:      Extraocular Movements: Extraocular movements intact. Conjunctiva/sclera: Conjunctivae normal.   Cardiovascular:      Rate and Rhythm: Normal rate and regular rhythm. Pulses: Normal pulses. Heart sounds: No murmur heard. No friction rub. No gallop. Pulmonary:      Effort: No respiratory distress. Breath sounds: No wheezing, rhonchi or rales. Abdominal:      General: Bowel sounds are normal. There is no distension. Palpations: Abdomen is soft. There is no hepatomegaly, splenomegaly or mass. Tenderness: There is no abdominal tenderness. There is no guarding. Musculoskeletal:      Cervical back: Neck supple.    Skin: General: Skin is warm. Findings: No rash. Neurological:      Mental Status: He is alert.

## 2021-11-19 NOTE — ASSESSMENT & PLAN NOTE
Continues to follow-up with Dr. Ulisses Villavicencio. on Adderall, Lexapro, Risperdal, trazodone, Vraylar

## 2021-11-19 NOTE — ASSESSMENT & PLAN NOTE
TSH high last visit. Increase dose to 75 mcg. Still feels cold.   Check TSH today, otherwise continue levothyroxine 75 mcg

## 2021-11-19 NOTE — ASSESSMENT & PLAN NOTE
Kidney function was mildly elevated last visit. Possibly related to NSAID use at that time. We discussed repeating kidney function and if better, then starting Truvada. He has had 1 high risk exposure since last visit so we will recheck HIV. HIV test today. If kidney function better, we discussed starting Truvada. Advised he will need testing every 3 months.   Renal dosing cut off GFR 50  Provided verbal permission for HIV testing

## 2021-11-19 NOTE — ASSESSMENT & PLAN NOTE
Still has occl urgency   flomax working for the most part   Dependent on caffeine intake  Continue tamsulosin, no changes recommended

## 2021-11-19 NOTE — ASSESSMENT & PLAN NOTE
Had a colonoscopy 3 years ago prior to moving here. Was told it was normal and due in 10 years.   Encourage healthy habits

## 2021-11-21 LAB
BUN SERPL-MCNC: 9 MG/DL (ref 6–24)
BUN/CREAT SERPL: 6 (ref 9–20)
CALCIUM SERPL-MCNC: 9.5 MG/DL (ref 8.7–10.2)
CHLORIDE SERPL-SCNC: 103 MMOL/L (ref 96–106)
CHOLEST SERPL-MCNC: 148 MG/DL (ref 100–199)
CO2 SERPL-SCNC: 26 MMOL/L (ref 20–29)
CREAT SERPL-MCNC: 1.49 MG/DL (ref 0.76–1.27)
EST. AVERAGE GLUCOSE BLD GHB EST-MCNC: 117 MG/DL
GLUCOSE SERPL-MCNC: 98 MG/DL (ref 65–99)
HBA1C MFR BLD: 5.7 % (ref 4.8–5.6)
HDLC SERPL-MCNC: 66 MG/DL
HIV 1+2 AB+HIV1 P24 AG SERPL QL IA: NON REACTIVE
IMP & REVIEW OF LAB RESULTS: NORMAL
INTERPRETATION: NORMAL
LDLC SERPL CALC-MCNC: 51 MG/DL (ref 0–99)
POTASSIUM SERPL-SCNC: 3.9 MMOL/L (ref 3.5–5.2)
SODIUM SERPL-SCNC: 143 MMOL/L (ref 134–144)
TRIGL SERPL-MCNC: 193 MG/DL (ref 0–149)
TSH SERPL DL<=0.005 MIU/L-ACNC: 1.96 UIU/ML (ref 0.45–4.5)
VLDLC SERPL CALC-MCNC: 31 MG/DL (ref 5–40)

## 2021-11-24 NOTE — PROGRESS NOTES
[employer] message sent. LDL 51. A1c 5.7. Prediabetic. Creatinine 1.49. Was 1.21 about a year ago. BMP otherwise normal.  TSH 1.96. HIV negative. Check Ua/urine protein. If normal. Consider truvada, but monitor Cr. No nsaids.   TSH good, cont levothyroxine dose

## 2021-12-22 ENCOUNTER — APPOINTMENT (OUTPATIENT)
Dept: INTERNAL MEDICINE CLINIC | Age: 56
End: 2021-12-22

## 2021-12-22 DIAGNOSIS — M79.89 SWELLING OF LOWER EXTREMITY: ICD-10-CM

## 2021-12-24 LAB
APPEARANCE UR: CLEAR
BACTERIA #/AREA URNS HPF: NORMAL /[HPF]
BILIRUB UR QL STRIP: NEGATIVE
CASTS URNS QL MICRO: NORMAL /LPF
COLOR UR: YELLOW
CREAT UR-MCNC: 253.3 MG/DL
EPI CELLS #/AREA URNS HPF: NORMAL /HPF (ref 0–10)
GLUCOSE UR QL: NEGATIVE
HGB UR QL STRIP: NEGATIVE
KETONES UR QL STRIP: ABNORMAL
LEUKOCYTE ESTERASE UR QL STRIP: NEGATIVE
MICRO URNS: ABNORMAL
MICRO URNS: ABNORMAL
NITRITE UR QL STRIP: NEGATIVE
PH UR STRIP: 6.5 [PH] (ref 5–7.5)
PROT UR QL STRIP: NEGATIVE
PROT UR-MCNC: 12.7 MG/DL
PROT/CREAT UR: 50 MG/G CREAT (ref 0–200)
RBC #/AREA URNS HPF: NORMAL /HPF (ref 0–2)
SP GR UR: 1.02 (ref 1–1.03)
UROBILINOGEN UR STRIP-MCNC: 0.2 MG/DL (ref 0.2–1)
WBC #/AREA URNS HPF: NORMAL /HPF (ref 0–5)

## 2022-01-03 DIAGNOSIS — E03.9 ACQUIRED HYPOTHYROIDISM: ICD-10-CM

## 2022-01-03 RX ORDER — LEVOTHYROXINE SODIUM 75 UG/1
TABLET ORAL
Qty: 90 TABLET | Refills: 3 | Status: SHIPPED | OUTPATIENT
Start: 2022-01-03

## 2022-01-05 DIAGNOSIS — Z79.899 ON PRE-EXPOSURE PROPHYLAXIS FOR HIV: Primary | ICD-10-CM

## 2022-01-05 RX ORDER — EMTRICITABINE AND TENOFOVIR DISOPROXIL FUMARATE 200; 300 MG/1; MG/1
1 TABLET, FILM COATED ORAL DAILY
Qty: 90 TABLET | Refills: 0 | Status: SHIPPED | OUTPATIENT
Start: 2022-01-05 | End: 2022-02-01

## 2022-01-12 DIAGNOSIS — F31.9 BIPOLAR AFFECTIVE DISORDER, REMISSION STATUS UNSPECIFIED (HCC): Primary | ICD-10-CM

## 2022-01-12 DIAGNOSIS — G47.00 INSOMNIA, UNSPECIFIED TYPE: ICD-10-CM

## 2022-01-12 DIAGNOSIS — F98.8 ATTENTION DEFICIT DISORDER, UNSPECIFIED HYPERACTIVITY PRESENCE: ICD-10-CM

## 2022-01-17 RX ORDER — DEXTROAMPHETAMINE SACCHARATE, AMPHETAMINE ASPARTATE, DEXTROAMPHETAMINE SULFATE AND AMPHETAMINE SULFATE 2.5; 2.5; 2.5; 2.5 MG/1; MG/1; MG/1; MG/1
10 TABLET ORAL DAILY
Qty: 30 TABLET | Refills: 0 | Status: SHIPPED | OUTPATIENT
Start: 2022-01-17 | End: 2022-03-21 | Stop reason: SDUPTHER

## 2022-01-17 RX ORDER — DEXTROAMPHETAMINE SACCHARATE, AMPHETAMINE ASPARTATE MONOHYDRATE, DEXTROAMPHETAMINE SULFATE AND AMPHETAMINE SULFATE 7.5; 7.5; 7.5; 7.5 MG/1; MG/1; MG/1; MG/1
30 CAPSULE, EXTENDED RELEASE ORAL
Qty: 30 CAPSULE | Refills: 0 | Status: SHIPPED | OUTPATIENT
Start: 2022-02-16

## 2022-01-17 RX ORDER — CARIPRAZINE 3 MG/1
3 CAPSULE, GELATIN COATED ORAL DAILY
Qty: 30 CAPSULE | Refills: 1 | Status: SHIPPED | OUTPATIENT
Start: 2022-01-17 | End: 2022-03-21 | Stop reason: SDUPTHER

## 2022-01-17 RX ORDER — DEXTROAMPHETAMINE SACCHARATE, AMPHETAMINE ASPARTATE, DEXTROAMPHETAMINE SULFATE AND AMPHETAMINE SULFATE 2.5; 2.5; 2.5; 2.5 MG/1; MG/1; MG/1; MG/1
10 TABLET ORAL DAILY
Qty: 30 TABLET | Refills: 0 | Status: SHIPPED | OUTPATIENT
Start: 2022-02-16

## 2022-01-17 RX ORDER — RISPERIDONE 2 MG/1
2 TABLET, FILM COATED ORAL DAILY
Qty: 30 TABLET | Refills: 1 | Status: SHIPPED | OUTPATIENT
Start: 2022-01-17 | End: 2022-03-21 | Stop reason: SDUPTHER

## 2022-01-17 RX ORDER — TRAZODONE HYDROCHLORIDE 100 MG/1
100 TABLET ORAL
Qty: 30 TABLET | Refills: 1 | Status: SHIPPED | OUTPATIENT
Start: 2022-01-17 | End: 2022-03-21 | Stop reason: SDUPTHER

## 2022-01-17 RX ORDER — DEXTROAMPHETAMINE SACCHARATE, AMPHETAMINE ASPARTATE MONOHYDRATE, DEXTROAMPHETAMINE SULFATE AND AMPHETAMINE SULFATE 7.5; 7.5; 7.5; 7.5 MG/1; MG/1; MG/1; MG/1
30 CAPSULE, EXTENDED RELEASE ORAL
Qty: 30 CAPSULE | Refills: 0 | Status: SHIPPED | OUTPATIENT
Start: 2022-01-17 | End: 2022-02-16 | Stop reason: SDUPTHER

## 2022-01-28 ENCOUNTER — OFFICE VISIT (OUTPATIENT)
Dept: INTERNAL MEDICINE CLINIC | Age: 57
End: 2022-01-28
Payer: COMMERCIAL

## 2022-01-28 VITALS
SYSTOLIC BLOOD PRESSURE: 109 MMHG | TEMPERATURE: 98 F | WEIGHT: 244 LBS | OXYGEN SATURATION: 94 % | HEART RATE: 90 BPM | BODY MASS INDEX: 34.93 KG/M2 | HEIGHT: 70 IN | DIASTOLIC BLOOD PRESSURE: 76 MMHG | RESPIRATION RATE: 14 BRPM

## 2022-01-28 DIAGNOSIS — Z79.899 ON PRE-EXPOSURE PROPHYLAXIS FOR HIV: Primary | ICD-10-CM

## 2022-01-28 DIAGNOSIS — G30.0 EARLY ONSET ALZHEIMER'S DEMENTIA WITHOUT BEHAVIORAL DISTURBANCE (HCC): ICD-10-CM

## 2022-01-28 DIAGNOSIS — F31.9 BIPOLAR AFFECTIVE DISORDER, REMISSION STATUS UNSPECIFIED (HCC): ICD-10-CM

## 2022-01-28 DIAGNOSIS — N18.31 STAGE 3A CHRONIC KIDNEY DISEASE (HCC): ICD-10-CM

## 2022-01-28 DIAGNOSIS — F02.80 EARLY ONSET ALZHEIMER'S DEMENTIA WITHOUT BEHAVIORAL DISTURBANCE (HCC): ICD-10-CM

## 2022-01-28 PROCEDURE — 99214 OFFICE O/P EST MOD 30 MIN: CPT | Performed by: INTERNAL MEDICINE

## 2022-01-28 NOTE — ASSESSMENT & PLAN NOTE
Started about am onth ago, doing well on labs  Check cr, otherwise continue.  Needs labs rechecked in 2 months

## 2022-01-28 NOTE — ASSESSMENT & PLAN NOTE
Pt having a hard time getting in touch with his psychiatrist  [facility] with filling meds temporarily until he can find a psychiatrist, but advised pt that I do not feel comfortable doing it in the long term.  Psych office info packet provided

## 2022-01-28 NOTE — PROGRESS NOTES
Note   Chief Complaint   meds Jackquelyn Barthel is a 55 y.o. male     Accompanied by his fiance    1. On pre-exposure prophylaxis for HIV  Assessment & Plan:  Started about am onth ago, doing well on labs  Check cr, otherwise continue. Needs labs rechecked in 2 months  2. Stage 3a chronic kidney disease ([facility]  -     METABOLIC PANEL, BASIC; Future  3. Early onset Alzheimer's dementia without behavioral disturbance ([facility] [facility]  Assessment & Plan:  No longer on donepezil, cont to monitor   4. Bipolar affective disorder, remission status unspecified ([facility] [facility]  Assessment & Plan:  Pt having a hard time getting in touch with his psychiatrist  [id number] [address] with filling meds temporarily until he can find a psychiatrist, but advised pt that I do not feel comfortable doing it in the long term. Psych office info packet provided     Benefits, risks, possible drug interactions, and side effects of all new medications were reviewed with the patient. Pt verbalized understanding. Return to clinic:  2 months for truvada    An electronic signature was used to authenticate this note. Christopher Cutler MD  [facility]  1/28/2022    No future appointments. Objective   Vitals:       Visit Vitals  /76 (BP 1 Location: Left upper arm, BP Patient Position: Sitting, BP Cuff Size: Adult)   Pulse 90   Temp 98 °F (36.7 °C) (Temporal)   Resp 14   Ht 5' 10\" (1.778 m)   Wt 244 lb (110.7 kg)   SpO2 94%   BMI 35.01 kg/m²        Physical Exam  Constitutional:       Appearance: Normal appearance. He is not ill-appearing. Cardiovascular:      Rate and Rhythm: Normal rate and regular rhythm. Heart sounds: No murmur heard. No friction rub. No gallop. Pulmonary:      Effort: No respiratory distress. Breath sounds: Normal breath sounds. No wheezing, rhonchi or rales. Neurological:      Mental Status: He is alert.           Current Outpatient Medications   Medication Sig    amphetamine-dextroamphetamine XR (ADDERALL XR) 30 mg XR capsule Take 1 Capsule by mouth every morning. Max Daily Amount: 30 mg.    dextroamphetamine-amphetamine (ADDERALL) 10 mg tablet Take 1 Tablet by mouth daily. Max Daily Amount: 10 mg.    Vraylar 3 mg capsule Take 1 Capsule by mouth daily. Indications: bipolar    traZODone (DESYREL) 100 mg tablet Take 1 Tablet by mouth nightly. Indications: insomnia    risperiDONE (RisperDAL) 2 mg tablet Take 1 Tablet by mouth daily.  [START ON 2/16/2022] dextroamphetamine-amphetamine (ADDERALL) 10 mg tablet Take 1 Tablet by mouth daily. Max Daily Amount: 10 mg.    [START ON 2/16/2022] amphetamine-dextroamphetamine XR (ADDERALL XR) 30 mg XR capsule Take 1 Capsule by mouth every morning. Max Daily Amount: 30 mg.    emtricitabine-tenofovir, TDF, (TRUVADA) 200-300 mg per tablet Take 1 Tablet by mouth daily.  levothyroxine (SYNTHROID) 75 mcg tablet TAKE 1 TABLET BY MOUTH EVERY DAY    tamsulosin (FLOMAX) 0.4 mg capsule TAKE 1 CAPSULE BY MOUTH EVERY DAY    rOPINIRole (REQUIP) 3 mg tab tab Take 1 Tablet by mouth nightly. Indications: restless legs syndrome, an extreme discomfort in the calf muscles when sitting or lying down    sildenafil citrate (VIAGRA) 100 mg tablet TAKE 1 TABLET AS NEEDED FOR ERECTILE DYSFUNCTION. MAX DAILY DOSE 1 TABLET    ondansetron (ZOFRAN ODT) 4 mg disintegrating tablet Take 1 Tab by mouth every eight (8) hours as needed for Nausea or Vomiting.  escitalopram oxalate (LEXAPRO) 5 mg tablet Take 5 mg by mouth daily.  pantoprazole (PROTONIX) 40 mg tablet TAKE 1 TABLET BY MOUTH EVERY DAY    rosuvastatin (CRESTOR) 20 mg tablet TAKE 1 TABLET BY MOUTH EVERY DAY    Multivitamins with Fluoride (MULTI-VITAMIN PO) Take  by mouth.  cholecalciferol (VITAMIN D3) (1000 Units /25 mcg) tablet Take  by mouth daily.  Lactobac no.41/Bifidobact no.7 (PROBIOTIC-10 PO) Take  by mouth.  MAGNESIUM PO Take 200 mg by mouth.  BIOTIN PO Take  by mouth.     cycloSPORINE (Restasis) 0.05 % dpet Administer 1 Drop to both eyes every twelve (12) hours. No current facility-administered medications for this visit.

## 2022-01-29 LAB
BUN SERPL-MCNC: 13 MG/DL (ref 6–24)
BUN/CREAT SERPL: 8 (ref 9–20)
CALCIUM SERPL-MCNC: 9.6 MG/DL (ref 8.7–10.2)
CHLORIDE SERPL-SCNC: 103 MMOL/L (ref 96–106)
CO2 SERPL-SCNC: 24 MMOL/L (ref 20–29)
CREAT SERPL-MCNC: 1.59 MG/DL (ref 0.76–1.27)
GLUCOSE SERPL-MCNC: 97 MG/DL (ref 65–99)
INTERPRETATION: NORMAL
POTASSIUM SERPL-SCNC: 4.1 MMOL/L (ref 3.5–5.2)
SODIUM SERPL-SCNC: 140 MMOL/L (ref 134–144)

## 2022-01-31 DIAGNOSIS — Z79.899 ON PRE-EXPOSURE PROPHYLAXIS FOR HIV: ICD-10-CM

## 2022-02-01 RX ORDER — EMTRICITABINE AND TENOFOVIR DISOPROXIL FUMARATE 200; 300 MG/1; MG/1
TABLET, FILM COATED ORAL
Qty: 30 TABLET | Refills: 1 | Status: SHIPPED | OUTPATIENT
Start: 2022-02-01 | End: 2022-04-15

## 2022-02-01 NOTE — PROGRESS NOTES
[employer] message sent.    ==  Your kidney function is very mildly decreased compared to last time.   We will continue to monitor

## 2022-02-16 DIAGNOSIS — F98.8 ATTENTION DEFICIT DISORDER, UNSPECIFIED HYPERACTIVITY PRESENCE: ICD-10-CM

## 2022-02-16 RX ORDER — DEXTROAMPHETAMINE SACCHARATE, AMPHETAMINE ASPARTATE MONOHYDRATE, DEXTROAMPHETAMINE SULFATE AND AMPHETAMINE SULFATE 7.5; 7.5; 7.5; 7.5 MG/1; MG/1; MG/1; MG/1
30 CAPSULE, EXTENDED RELEASE ORAL
Qty: 30 CAPSULE | Refills: 0 | Status: SHIPPED | OUTPATIENT
Start: 2022-02-16 | End: 2022-03-21 | Stop reason: SDUPTHER

## 2022-03-18 PROBLEM — E03.9 ACQUIRED HYPOTHYROIDISM: Status: ACTIVE | Noted: 2020-08-26

## 2022-03-18 PROBLEM — Z79.899 ON PRE-EXPOSURE PROPHYLAXIS FOR HIV: Status: ACTIVE | Noted: 2021-08-17

## 2022-03-18 PROBLEM — N18.31 STAGE 3A CHRONIC KIDNEY DISEASE (HCC): Status: ACTIVE | Noted: 2022-01-28

## 2022-03-19 PROBLEM — G30.0 EARLY ONSET ALZHEIMER'S DEMENTIA WITHOUT BEHAVIORAL DISTURBANCE (HCC): Status: ACTIVE | Noted: 2020-08-26

## 2022-03-19 PROBLEM — E66.9 OBESITY (BMI 30-39.9): Status: ACTIVE | Noted: 2020-08-26

## 2022-03-19 PROBLEM — F98.8 ATTENTION DEFICIT DISORDER, UNSPECIFIED HYPERACTIVITY PRESENCE: Status: ACTIVE | Noted: 2020-08-26

## 2022-03-19 PROBLEM — B35.1 ONYCHOMYCOSIS: Status: ACTIVE | Noted: 2021-08-17

## 2022-03-19 PROBLEM — N40.1 BENIGN PROSTATIC HYPERPLASIA WITH URINARY FREQUENCY: Status: ACTIVE | Noted: 2021-08-17

## 2022-03-19 PROBLEM — E78.5 HYPERLIPIDEMIA, UNSPECIFIED HYPERLIPIDEMIA TYPE: Status: ACTIVE | Noted: 2020-08-26

## 2022-03-19 PROBLEM — R73.03 PREDIABETES: Status: ACTIVE | Noted: 2020-09-03

## 2022-03-19 PROBLEM — K21.9 GASTROESOPHAGEAL REFLUX DISEASE WITHOUT ESOPHAGITIS: Status: ACTIVE | Noted: 2020-08-26

## 2022-03-19 PROBLEM — Z00.00 WELL ADULT EXAM: Status: ACTIVE | Noted: 2021-11-19

## 2022-03-19 PROBLEM — F33.0 MILD EPISODE OF RECURRENT MAJOR DEPRESSIVE DISORDER (HCC): Status: ACTIVE | Noted: 2020-08-26

## 2022-03-19 PROBLEM — H04.123 DRY EYES: Status: ACTIVE | Noted: 2020-08-26

## 2022-03-19 PROBLEM — F02.80 EARLY ONSET ALZHEIMER'S DEMENTIA WITHOUT BEHAVIORAL DISTURBANCE (HCC): Status: ACTIVE | Noted: 2020-08-26

## 2022-03-19 PROBLEM — J30.9 CHRONIC ALLERGIC RHINITIS: Status: ACTIVE | Noted: 2020-08-26

## 2022-03-19 PROBLEM — R35.0 BENIGN PROSTATIC HYPERPLASIA WITH URINARY FREQUENCY: Status: ACTIVE | Noted: 2021-08-17

## 2022-03-19 PROBLEM — G25.81 RESTLESS LEG SYNDROME: Status: ACTIVE | Noted: 2020-08-26

## 2022-03-20 PROBLEM — G43.909 MIGRAINES: Status: ACTIVE | Noted: 2020-08-26

## 2022-03-20 PROBLEM — M25.512 CHRONIC LEFT SHOULDER PAIN: Status: ACTIVE | Noted: 2021-08-17

## 2022-03-20 PROBLEM — G89.29 CHRONIC LEFT SHOULDER PAIN: Status: ACTIVE | Noted: 2021-08-17

## 2022-03-20 PROBLEM — M79.89 LEG SWELLING: Status: ACTIVE | Noted: 2021-08-17

## 2022-03-21 DIAGNOSIS — F98.8 ATTENTION DEFICIT DISORDER, UNSPECIFIED HYPERACTIVITY PRESENCE: ICD-10-CM

## 2022-03-21 DIAGNOSIS — G47.00 INSOMNIA, UNSPECIFIED TYPE: ICD-10-CM

## 2022-03-21 DIAGNOSIS — F31.9 BIPOLAR AFFECTIVE DISORDER, REMISSION STATUS UNSPECIFIED (HCC): ICD-10-CM

## 2022-03-21 RX ORDER — DEXTROAMPHETAMINE SACCHARATE, AMPHETAMINE ASPARTATE, DEXTROAMPHETAMINE SULFATE AND AMPHETAMINE SULFATE 2.5; 2.5; 2.5; 2.5 MG/1; MG/1; MG/1; MG/1
10 TABLET ORAL DAILY
Qty: 30 TABLET | Refills: 0 | Status: SHIPPED | OUTPATIENT
Start: 2022-04-20 | End: 2022-06-24

## 2022-03-21 RX ORDER — RISPERIDONE 2 MG/1
2 TABLET, FILM COATED ORAL DAILY
Qty: 30 TABLET | Refills: 1 | Status: SHIPPED | OUTPATIENT
Start: 2022-03-21

## 2022-03-21 RX ORDER — DEXTROAMPHETAMINE SACCHARATE, AMPHETAMINE ASPARTATE, DEXTROAMPHETAMINE SULFATE AND AMPHETAMINE SULFATE 2.5; 2.5; 2.5; 2.5 MG/1; MG/1; MG/1; MG/1
10 TABLET ORAL DAILY
Qty: 30 TABLET | Refills: 0 | Status: SHIPPED | OUTPATIENT
Start: 2022-03-21 | End: 2022-06-24

## 2022-03-21 RX ORDER — CARIPRAZINE 3 MG/1
3 CAPSULE, GELATIN COATED ORAL DAILY
Qty: 30 CAPSULE | Refills: 1 | Status: SHIPPED | OUTPATIENT
Start: 2022-03-21 | End: 2022-08-01 | Stop reason: ALTCHOICE

## 2022-03-21 RX ORDER — DEXTROAMPHETAMINE SACCHARATE, AMPHETAMINE ASPARTATE MONOHYDRATE, DEXTROAMPHETAMINE SULFATE AND AMPHETAMINE SULFATE 7.5; 7.5; 7.5; 7.5 MG/1; MG/1; MG/1; MG/1
30 CAPSULE, EXTENDED RELEASE ORAL
Qty: 30 CAPSULE | Refills: 0 | Status: SHIPPED | OUTPATIENT
Start: 2022-04-20 | End: 2022-06-24

## 2022-03-21 RX ORDER — TRAZODONE HYDROCHLORIDE 100 MG/1
100 TABLET ORAL
Qty: 30 TABLET | Refills: 1 | Status: SHIPPED | OUTPATIENT
Start: 2022-03-21

## 2022-03-21 RX ORDER — DEXTROAMPHETAMINE SACCHARATE, AMPHETAMINE ASPARTATE MONOHYDRATE, DEXTROAMPHETAMINE SULFATE AND AMPHETAMINE SULFATE 7.5; 7.5; 7.5; 7.5 MG/1; MG/1; MG/1; MG/1
30 CAPSULE, EXTENDED RELEASE ORAL
Qty: 30 CAPSULE | Refills: 0 | Status: SHIPPED | OUTPATIENT
Start: 2022-03-21 | End: 2022-06-24

## 2022-04-12 DIAGNOSIS — Z79.899 ON PRE-EXPOSURE PROPHYLAXIS FOR HIV: ICD-10-CM

## 2022-04-12 DIAGNOSIS — Z79.899 ON PRE-EXPOSURE PROPHYLAXIS FOR HIV: Primary | ICD-10-CM

## 2022-04-12 NOTE — TELEPHONE ENCOUNTER
Call made to patient to advise of providers message. Patient requested for labs to be sent to [facility]. Orders faxed.

## 2022-04-12 NOTE — TELEPHONE ENCOUNTER
STD testing including HIV and other blood tests required for refill. Also needs follow up.   I can send meds when labs are done but will need appt for further refills

## 2022-04-14 DIAGNOSIS — R39.15 URINARY URGENCY: ICD-10-CM

## 2022-04-14 LAB
APPEARANCE UR: CLEAR
BILIRUB UR QL STRIP: NEGATIVE
BUN SERPL-MCNC: 11 MG/DL (ref 6–24)
BUN/CREAT SERPL: 7 (ref 9–20)
C TRACH RRNA SPEC QL NAA+PROBE: NEGATIVE
CALCIUM SERPL-MCNC: 9.7 MG/DL (ref 8.7–10.2)
CHLORIDE SERPL-SCNC: 104 MMOL/L (ref 96–106)
CO2 SERPL-SCNC: 23 MMOL/L (ref 20–29)
COLOR UR: YELLOW
CREAT SERPL-MCNC: 1.54 MG/DL (ref 0.76–1.27)
EGFR: 53 ML/MIN/1.73
GLUCOSE SERPL-MCNC: 108 MG/DL (ref 65–99)
GLUCOSE UR QL STRIP: NEGATIVE
HBV CORE AB SERPL QL IA: NEGATIVE
HBV CORE IGM SERPL QL IA: NEGATIVE
HBV SURFACE AB SER QL: NON REACTIVE
HBV SURFACE AG SERPL QL IA: NEGATIVE
HCV AB S/CO SERPL IA: <0.1 S/CO RATIO (ref 0–0.9)
HGB UR QL STRIP: NEGATIVE
HIV 1+2 AB+HIV1 P24 AG SERPL QL IA: NON REACTIVE
INTERPRETATION: NORMAL
KETONES UR QL STRIP: ABNORMAL
LEUKOCYTE ESTERASE UR QL STRIP: NEGATIVE
MICRO URNS: ABNORMAL
N GONORRHOEA RRNA SPEC QL NAA+PROBE: NEGATIVE
NITRITE UR QL STRIP: NEGATIVE
PH UR STRIP: 5.5 [PH] (ref 5–7.5)
POTASSIUM SERPL-SCNC: 4.5 MMOL/L (ref 3.5–5.2)
PROT UR QL STRIP: ABNORMAL
SODIUM SERPL-SCNC: 146 MMOL/L (ref 134–144)
SP GR UR STRIP: 1.02 (ref 1–1.03)
T VAGINALIS RRNA SPEC QL NAA+PROBE: NEGATIVE
TREPONEMA PALLIDUM IGG+IGM AB [PRESENCE] IN SERUM OR PLASMA BY IMMUNOASSAY: NON REACTIVE
UROBILINOGEN UR STRIP-MCNC: 0.2 MG/DL (ref 0.2–1)

## 2022-04-15 RX ORDER — EMTRICITABINE AND TENOFOVIR DISOPROXIL FUMARATE 200; 300 MG/1; MG/1
TABLET, FILM COATED ORAL
Qty: 30 TABLET | Refills: 1 | Status: SHIPPED | OUTPATIENT
Start: 2022-04-15 | End: 2022-06-13

## 2022-04-15 RX ORDER — TAMSULOSIN HYDROCHLORIDE 0.4 MG/1
CAPSULE ORAL
Qty: 90 CAPSULE | Refills: 3 | Status: SHIPPED | OUTPATIENT
Start: 2022-04-15 | End: 2022-06-24

## 2022-04-17 NOTE — PROGRESS NOTES
[employer] message sent. HIV negative. Gonorrhea, chlamydia, trichomonas negative. Syphilis negative. Hep C negative. Hep B negative. No hep B surface antibody. Creatinine stable. Sodium 146.   BMP otherwise normal.  UA with trace ketones and trace protein    Nonresponder to HBV  STD testing negative  Increase hydration for Na

## 2022-06-24 ENCOUNTER — HOSPITAL ENCOUNTER (EMERGENCY)
Age: 57
Discharge: HOME | End: 2022-06-24
Attending: EMERGENCY MEDICINE
Payer: COMMERCIAL

## 2022-06-24 ENCOUNTER — APPOINTMENT (OUTPATIENT)
Dept: GENERAL RADIOLOGY | Age: 57
End: 2022-06-24
Attending: EMERGENCY MEDICINE
Payer: COMMERCIAL

## 2022-06-24 ENCOUNTER — TELEPHONE (OUTPATIENT)
Dept: INTERNAL MEDICINE CLINIC | Age: 57
End: 2022-06-24

## 2022-06-24 VITALS
DIASTOLIC BLOOD PRESSURE: 69 MMHG | BODY MASS INDEX: 35.79 KG/M2 | OXYGEN SATURATION: 98 % | RESPIRATION RATE: 12 BRPM | HEART RATE: 85 BPM | TEMPERATURE: 97.6 F | HEIGHT: 70 IN | WEIGHT: 250 LBS | SYSTOLIC BLOOD PRESSURE: 108 MMHG

## 2022-06-24 DIAGNOSIS — R06.09 DYSPNEA ON EXERTION: Primary | ICD-10-CM

## 2022-06-24 LAB
ALBUMIN SERPL-MCNC: 3.6 G/DL (ref 3.5–5)
ALBUMIN/GLOB SERPL: 1.1 {RATIO} (ref 1.1–2.2)
ALP SERPL-CCNC: 68 U/L (ref 45–117)
ALT SERPL-CCNC: 35 U/L (ref 12–78)
ANION GAP SERPL CALC-SCNC: 10 MMOL/L (ref 5–15)
AST SERPL-CCNC: 21 U/L (ref 15–37)
BASOPHILS # BLD: 0.1 K/UL (ref 0–0.1)
BASOPHILS NFR BLD: 1 % (ref 0–1)
BILIRUB SERPL-MCNC: 0.3 MG/DL (ref 0.2–1)
BNP SERPL-MCNC: 24 PG/ML (ref 0–125)
BUN SERPL-MCNC: 14 MG/DL (ref 6–20)
BUN/CREAT SERPL: 9 (ref 12–20)
CALCIUM SERPL-MCNC: 9.1 MG/DL (ref 8.5–10.1)
CHLORIDE SERPL-SCNC: 107 MMOL/L (ref 97–108)
CO2 SERPL-SCNC: 26 MMOL/L (ref 21–32)
COMMENT, HOLDF: NORMAL
COVID-19 RAPID TEST, COVR: NOT DETECTED
CREAT SERPL-MCNC: 1.59 MG/DL (ref 0.7–1.3)
D DIMER PPP FEU-MCNC: <0.19 MG/L FEU (ref 0–0.65)
DIFFERENTIAL METHOD BLD: NORMAL
EOSINOPHIL # BLD: 0.2 K/UL (ref 0–0.4)
EOSINOPHIL NFR BLD: 2 % (ref 0–7)
ERYTHROCYTE [DISTWIDTH] IN BLOOD BY AUTOMATED COUNT: 14.5 % (ref 11.5–14.5)
GLOBULIN SER CALC-MCNC: 3.3 G/DL (ref 2–4)
GLUCOSE SERPL-MCNC: 112 MG/DL (ref 65–100)
HCT VFR BLD AUTO: 47 % (ref 36.6–50.3)
HGB BLD-MCNC: 15 G/DL (ref 12.1–17)
IMM GRANULOCYTES # BLD AUTO: 0 K/UL (ref 0–0.04)
IMM GRANULOCYTES NFR BLD AUTO: 0 % (ref 0–0.5)
LYMPHOCYTES # BLD: 2.1 K/UL (ref 0.8–3.5)
LYMPHOCYTES NFR BLD: 21 % (ref 12–49)
MCH RBC QN AUTO: 29.4 PG (ref 26–34)
MCHC RBC AUTO-ENTMCNC: 31.9 G/DL (ref 30–36.5)
MCV RBC AUTO: 92 FL (ref 80–99)
MONOCYTES # BLD: 0.9 K/UL (ref 0–1)
MONOCYTES NFR BLD: 9 % (ref 5–13)
NEUTS SEG # BLD: 6.7 K/UL (ref 1.8–8)
NEUTS SEG NFR BLD: 67 % (ref 32–75)
NRBC # BLD: 0 K/UL (ref 0–0.01)
NRBC BLD-RTO: 0 PER 100 WBC
PLATELET # BLD AUTO: 253 K/UL (ref 150–400)
PMV BLD AUTO: 11.4 FL (ref 8.9–12.9)
POTASSIUM SERPL-SCNC: 4.1 MMOL/L (ref 3.5–5.1)
PROT SERPL-MCNC: 6.9 G/DL (ref 6.4–8.2)
RBC # BLD AUTO: 5.11 M/UL (ref 4.1–5.7)
SAMPLES BEING HELD,HOLD: NORMAL
SODIUM SERPL-SCNC: 143 MMOL/L (ref 136–145)
SOURCE, COVRS: NORMAL
TROPONIN-HIGH SENSITIVITY: <4 NG/L (ref 0–76)
WBC # BLD AUTO: 9.9 K/UL (ref 4.1–11.1)

## 2022-06-24 PROCEDURE — 84484 ASSAY OF TROPONIN QUANT: CPT

## 2022-06-24 PROCEDURE — 36415 COLL VENOUS BLD VENIPUNCTURE: CPT

## 2022-06-24 PROCEDURE — 83880 ASSAY OF NATRIURETIC PEPTIDE: CPT

## 2022-06-24 PROCEDURE — 99285 EMERGENCY DEPT VISIT HI MDM: CPT

## 2022-06-24 PROCEDURE — 80053 COMPREHEN METABOLIC PANEL: CPT

## 2022-06-24 PROCEDURE — 87635 SARS-COV-2 COVID-19 AMP PRB: CPT

## 2022-06-24 PROCEDURE — 85025 COMPLETE CBC W/AUTO DIFF WBC: CPT

## 2022-06-24 PROCEDURE — 71045 X-RAY EXAM CHEST 1 VIEW: CPT

## 2022-06-24 PROCEDURE — 85379 FIBRIN DEGRADATION QUANT: CPT

## 2022-06-24 PROCEDURE — 93005 ELECTROCARDIOGRAM TRACING: CPT

## 2022-06-24 NOTE — TELEPHONE ENCOUNTER
Incoming call from patient patient stated that he has been experiencing SOB during exacerbation, walking the dog only last a few minutes. Stated that the only time that the SOB happens is when he is outside. Patient made an appt to see pcp on Wednesday. Nurse medically advise urgent care for evaluation if patient experience any SOB today or over the weekend. Patient verbalized understanding and was thankful.

## 2022-06-24 NOTE — ED TRIAGE NOTES
Pt presents to ED with c/o dyspnea with exertion and elevated HR over the heart rate over the past two weeks. He states he had some coughing after getting out of the shower.

## 2022-06-24 NOTE — ED PROVIDER NOTES
The history is provided by the patient and a friend. Shortness of Breath  This is a new problem. The problem occurs intermittently. The current episode started more than 1 week ago. The problem has not changed since onset. Associated symptoms include sore throat. Pertinent negatives include no fever, no headaches, no coryza, no rhinorrhea, no swollen glands, no ear pain, no neck pain, no cough, no sputum production, no hemoptysis, no wheezing, no PND, no orthopnea, no chest pain, no syncope, no vomiting, no abdominal pain, no rash, no leg pain, no leg swelling and no claudication. He has tried nothing for the symptoms. The treatment provided no relief. Associated medical issues do not include asthma, COPD, pneumonia, chronic lung disease, PE, CAD, heart failure, past MI, DVT or recent surgery.       Past Medical History:   Diagnosis Date    Bipolar disorder ([facility] [address]     Restless leg syndrome     Vitamin D deficiency        Past Surgical History:   Procedure Laterality Date    HX APPENDECTOMY      HX CHOLECYSTECTOMY      HX OTHER SURGICAL      Sinus Surgery    HX UROLOGICAL      varicocele repair         Family History:   Problem Relation Age of Onset    Cancer Mother         breast x2    Bipolar Disorder Father     Stroke Father 68    Cancer Maternal Grandmother     Cancer Paternal Grandmother         ?pancreatic    Thyroid Disease Paternal Grandmother        Social History     Socioeconomic History    Marital status:      Spouse name: Not on file    Number of children: Not on file    Years of education: Not on file    Highest education level: Not on file   Occupational History    Not on file   Tobacco Use    Smoking status: Never Smoker    Smokeless tobacco: Never Used   Vaping Use    Vaping Use: Never used   Substance and Sexual Activity    Alcohol use: Yes     Comment: one drink every other week    Drug use: Never    Sexual activity: Yes     Partners: Female, Male     Birth control/protection: Surgical   Other Topics Concern    Not on file   Social History Narrative    Not on file     Social Determinants of Health     Financial Resource Strain:     Difficulty of Paying Living Expenses: Not on file   Food Insecurity:     Worried About [address] in the Last Year: Not on file    Ran Out of Food in the Last Year: Not on file   Transportation Needs:     Lack of Transportation (Medical): Not on file    Lack of Transportation (Non-Medical): Not on file   Physical Activity:     Days of Exercise per Week: Not on file    Minutes of Exercise per Session: Not on file   Stress:     Feeling of Stress : Not on file   Social Connections:     Frequency of Communication with Friends and Family: Not on file    Frequency of Social Gatherings with Friends and Family: Not on file    Attends [employer]: Not on file    Active Member of Clubs or Organizations: Not on file    Attends Club or Organization Meetings: Not on file    Marital Status: Not on file   Intimate Partner Violence:     Fear of Current or Ex-Partner: Not on file    Emotionally Abused: Not on file    Physically Abused: Not on file    Sexually Abused: Not on file   Housing Stability:     Unable to Pay for Housing in the Last Year: Not on file    Number of [place] in the Last Year: Not on file    Unstable Housing in the Last Year: Not on file         ALLERGIES: Iodinated contrast media, Other medication, and Rifampin    Review of Systems   Constitutional:  Negative for activity change, chills and fever. HENT:  Positive for sore throat. Negative for ear pain, nosebleeds, rhinorrhea, trouble swallowing and voice change. Eyes:  Negative for visual disturbance. Respiratory:  Positive for shortness of breath. Negative for cough, hemoptysis, sputum production and wheezing. Cardiovascular:  Negative for chest pain, palpitations, orthopnea, claudication, leg swelling, syncope and PND.    Gastrointestinal:  Negative for abdominal pain, constipation, diarrhea, nausea and vomiting. Genitourinary:  Negative for difficulty urinating, dysuria, hematuria and urgency. Musculoskeletal:  Negative for back pain, neck pain and neck stiffness. Skin:  Negative for color change and rash. Allergic/Immunologic: Negative for immunocompromised state. Neurological:  Negative for dizziness, seizures, syncope, weakness, light-headedness, numbness and headaches. Psychiatric/Behavioral:  Negative for behavioral problems, confusion, hallucinations, self-injury and suicidal ideas. Vitals:    06/24/22 1820 06/24/22 1830   BP: 95/69 108/69   Pulse: 85 85   Resp: 16 12   Temp: 97.6 °F (36.4 °C)    SpO2: 94% 98%   Weight: 113.4 kg (250 lb)    Height: 5' 10\" (1.778 m)             Physical Exam  Vitals and nursing note reviewed. Constitutional:       General: He is not in acute distress. Appearance: He is well-developed. He is not diaphoretic. HENT:      Head: Normocephalic and atraumatic. Eyes:      Pupils: Pupils are equal, round, and reactive to light. Cardiovascular:      Rate and Rhythm: Normal rate and regular rhythm. Heart sounds: Normal heart sounds. No murmur heard. No friction rub. No gallop. Pulmonary:      Effort: Pulmonary effort is normal. No respiratory distress. Breath sounds: Normal breath sounds. No wheezing. Abdominal:      General: Bowel sounds are normal. There is no distension. Palpations: Abdomen is soft. Tenderness: There is no abdominal tenderness. There is no guarding or rebound. Musculoskeletal:         General: Normal range of motion. Cervical back: Normal range of motion and neck supple. Skin:     General: Skin is warm. Findings: No rash. Neurological:      Mental Status: He is alert and oriented to person, place, and time. Psychiatric:         Behavior: Behavior normal.         Thought Content:  Thought content normal.         Judgment: Judgment normal.        MDM       This is a 73-year-old male with past medical history, review of systems, physical exam as above, presenting with complaints of dyspnea on exertion. Patient states 2 weeks of dyspnea on exertion, and \"racing heart rate\". He endorses the previous episodes of climbing stairs, without shortness of breath, and has since noted heart rates in the 140s and 150s with exertion. He denies associated chest pain. He denies cough, fever, endorses \"scratchy throat\". He endorses previous echocardiogram, reported as normal, endorses a allergy to contrast media this been pretreated successfully. He denies tobacco use. Physical exam is remarkable for well-appearing middle-age male, in no acute distress noted to be normotensive, afebrile without tachycardia, satting well on room air. He has no pedal edema, no JVD, clear breath sounds auscultation, regular rate and rhythm without murmurs gallops or rubs. Discussed with patient differential includes heart failure, ACS, PE, COVID-19. Plan to obtain CMP, CBC, EKG, chest x-ray, cardiac enzymes, D-dimer, COVID swab. We will reassess, and make a disposition. Procedures    SIGN OUT:  7:00 PM  Discussed pt's hx, disposition, and available diagnostic and imaging results with Dr. Vicky Jones. Reviewed care plans. Both providers and patient are in agreement with care plan. Dr. John Sherman is transferring care of the pt to Dr. Vicky Jones at this time. EKG interpretation: (Preliminary)  Rhythm: normal sinus rhythm; and regular . Rate (approx.): 82; Axis: normal; P wave: normal; QRS interval: normal ; ST/T wave: non-specific changes; Other findings: abnormal ekg.

## 2022-06-25 NOTE — DISCHARGE INSTRUCTIONS
Your work up today showed you did not have a heart attack and there is no blood clot in your lungs. Please follow up with cardiology for further work up as an outpatient. Thank you.

## 2022-06-25 NOTE — ED NOTES
Sign out received from Dr Brittanie Scott pending troponin. Pt remains well appearing, HD stable. No symptoms at rest. Dr Brittanie Scott had previously had conversation with patient offering admission vs outpatient work up. Pt would prefer to be discharged and FU next week. Recommended against strenuous activity until cardiology evaluation. Stable for dc.      Ankit Russell MD

## 2022-06-26 LAB
ATRIAL RATE: 82 BPM
CALCULATED P AXIS, ECG09: 28 DEGREES
CALCULATED R AXIS, ECG10: 39 DEGREES
CALCULATED T AXIS, ECG11: 25 DEGREES
DIAGNOSIS, 93000: NORMAL
P-R INTERVAL, ECG05: 132 MS
Q-T INTERVAL, ECG07: 354 MS
QRS DURATION, ECG06: 84 MS
QTC CALCULATION (BEZET), ECG08: 413 MS
VENTRICULAR RATE, ECG03: 82 BPM

## 2022-06-28 ENCOUNTER — OFFICE VISIT (OUTPATIENT)
Dept: CARDIOLOGY CLINIC | Age: 57
End: 2022-06-28
Payer: COMMERCIAL

## 2022-06-28 VITALS
OXYGEN SATURATION: 95 % | SYSTOLIC BLOOD PRESSURE: 106 MMHG | BODY MASS INDEX: 36.33 KG/M2 | DIASTOLIC BLOOD PRESSURE: 72 MMHG | WEIGHT: 253.8 LBS | HEART RATE: 80 BPM | HEIGHT: 70 IN

## 2022-06-28 DIAGNOSIS — R06.02 SHORTNESS OF BREATH: ICD-10-CM

## 2022-06-28 DIAGNOSIS — R00.2 PALPITATIONS: Primary | ICD-10-CM

## 2022-06-28 PROCEDURE — 99204 OFFICE O/P NEW MOD 45 MIN: CPT | Performed by: INTERNAL MEDICINE

## 2022-06-28 NOTE — PROGRESS NOTES
Kaci Kay is a 55 y.o. male    Chief Complaint   Patient presents with    New Patient     ER f/u    Shortness of Breath       Chest pain No    SOB with exertions    Dizziness No    Swelling some swelling     Refills No    Visit Vitals  /72 (BP 1 Location: Left upper arm, BP Patient Position: Sitting)   Pulse 80   Ht 5' 10\" (1.778 m)   Wt 253 lb 12.8 oz (115.1 kg)   SpO2 95%   BMI 36.42 kg/m²       1. Have you been to the ER, urgent care clinic since your last visit? Hospitalized since your last visit? ED 6/24    2. Have you seen or consulted any other health care providers outside of the [address] [place] [address] since your last visit? Include any pap smears or colon screening.   No

## 2022-06-28 NOTE — LETTER
7/2/2022    Patient: Natty Huertas   YOB: 1965   Date of Visit: 6/28/2022     Preston Collins, [address] [place]  [address]  [address] [place] [address]  Via [facility] [address]    Dear Preston Collins MD,      Thank you for referring Mr. Natty Huertas to [facility] for evaluation. My notes for this consultation are attached. If you have questions, please do not hesitate to call me. I look forward to following your patient along with you.       Sincerely,    Ana Hyatt MD

## 2022-06-28 NOTE — PROGRESS NOTES
Mini Gifford MD., [facility]    [address] [facility] [place], [address] [facility]    Office [phone],SQS [phone]           David Sultana is a 55 y.o. male referred for evaluation of dsypnea. Consult requested by Desmond Valle MD    CC - as documented in EMR    Dear Dr. Desmond Valle MD    I had the pleasure of seeing Mr. David Sultana in the office today. Assessment:     Dyspnea  Palpitations  Hx of Bipolar disorder  Severe Obesity        Plan:      Stress nuclear study  Echocardiogram  7-day event monitor  Blood pressure controlled  Aggressive cardiovascular risk factor modification including weight loss. Follow-up 4 weeks/earlier as needed    Patient understands the plan. All questions were answered to the patient's satisfaction. I appreciate the opportunity to be involved in . See note below for details. Please do not hesitate to contact us   with questions or concerns. Mini Gifford MD      Cardiac Testing/ Procedures: A. Cardiac Cath/PCI:    B.ECHO/PEARL:    C.StressNuclear/Stress ECHO/Stress test:    D.Vascular:    E. EP:    F. Miscellaneous:    History:     David Sultana is a 55 y.o. male who is referred for evaluation of dyspnea. Patient came to emergency room on 6/24/2022 for dyspnea on exertion. He has been having symptoms for the past 1 to 2 weeks. Occurs especially when he climbs a flight of steps. No chest pressure. Has noticed palpitations. No dizziness or syncope. No prior history of CAD/arrhythmia. Former -in [place] [id number].     Past Medical/Surgical and Family/Social History:     Past Medical History:   Diagnosis Date    B[facility] disorder ([facility] [address]     Restless leg syndrome     Vitamin D deficiency       Past Surgical History:   Procedure Laterality Date    HX APPENDECTOMY      HX CHOLECYSTECTOMY      HX OTHER SURGICAL      Sinus Surgery    HX UROLOGICAL      varicocele repair     Family History   Problem Relation Age of Onset    Cancer Mother         breast x2    Bipolar Disorder Father     Stroke Father 68    Cancer Maternal Grandmother     Cancer Paternal Grandmother         ?pancreatic    Thyroid Disease Paternal Grandmother       Social History     Tobacco Use    Smoking status: Never Smoker    Smokeless tobacco: Never Used   Vaping Use    Vaping Use: Never used   Substance Use Topics    Alcohol use: Yes     Comment: one drink every other week    Drug use: Never            Medications:       Current Outpatient Medications   Medication Sig Dispense    emtricitabine-tenofovir, TDF, (TRUVADA) 200-300 mg per tablet TAKE 1 TABLET BY MOUTH EVERY DAY 30 Tablet    Vraylar 3 mg capsule Take 1 Capsule by mouth daily. Indications: bipolar (Patient taking differently: Take 1.5 mg by mouth daily. Indications: bipolar) 30 Capsule    traZODone (DESYREL) 100 mg tablet Take 1 Tablet by mouth nightly. Indications: insomnia 30 Tablet    risperiDONE (RisperDAL) 2 mg tablet Take 1 Tablet by mouth daily. 30 Tablet    dextroamphetamine-amphetamine (ADDERALL) 10 mg tablet Take 1 Tablet by mouth daily. Max Daily Amount: 10 mg. 30 Tablet    amphetamine-dextroamphetamine XR (ADDERALL XR) 30 mg XR capsule Take 1 Capsule by mouth every morning. Max Daily Amount: 30 mg. 30 Capsule    levothyroxine (SYNTHROID) 75 mcg tablet TAKE 1 TABLET BY MOUTH EVERY DAY 90 Tablet    rOPINIRole (REQUIP) 3 mg tab tab Take 1 Tablet by mouth nightly. Indications: restless legs syndrome, an extreme discomfort in the calf muscles when sitting or lying down 90 Tablet    sildenafil citrate (VIAGRA) 100 mg tablet TAKE 1 TABLET AS NEEDED FOR ERECTILE DYSFUNCTION. MAX DAILY DOSE 1 TABLET 30 Tablet    ondansetron (ZOFRAN ODT) 4 mg disintegrating tablet Take 1 Tab by mouth every eight (8) hours as needed for Nausea or Vomiting. 15 Tab    escitalopram oxalate (LEXAPRO) 5 mg tablet Take 10 mg by mouth daily.      pantoprazole (PROTONIX) 40 mg tablet TAKE 1 TABLET BY MOUTH EVERY DAY     rosuvastatin (CRESTOR) 20 mg tablet TAKE 1 TABLET BY MOUTH EVERY DAY     Multivitamins with Fluoride (MULTI-VITAMIN PO) Take  by mouth.  cholecalciferol (VITAMIN D3) (1000 Units /25 mcg) tablet Take  by mouth daily.  Lactobac no.41/Bifidobact no.7 (PROBIOTIC-10 PO) Take  by mouth.  MAGNESIUM PO Take 200 mg by mouth.  BIOTIN PO Take  by mouth.  cycloSPORINE (Restasis) 0.05 % dpet Administer 1 Drop to both eyes every twelve (12) hours. No current facility-administered medications for this visit. Review of Systems:     (bold if positive, if negative)    Gen:  Eyes:  ENT:  CVS:Pulm:  GI:  :  MS:  Pain, arthritisSkin:  Psych:  anxietyEndo:  Hem:  Renal:  Neuro:  Physical Exam:     Visit Vitals  /72 (BP 1 Location: Left upper arm, BP Patient Position: Sitting)   Pulse 80   Ht 5' 10\" (1.778 m)   Wt 253 lb 12.8 oz (115.1 kg)   SpO2 95%   BMI 36.42 kg/m²          Gen: Well-developed, well-nourished, in no acute distress  Neck: Supple,No JVD, No Carotid Bruit,   Resp: No accessory muscle use, Clear breath sounds, No rales or rhonchi  Card: Regular Rate,Rythm,Normal S1, S2, No murmurs, rubs or gallop. No thrills.    Abd:  Soft, non-tender, non-distended,BS+,   MSK: No cyanosis  Skin: No rashes    Neuro: moving all four extremities , follows commands appropriately  Psych:  Good insight, oriented to person, place , alert, Nml Affect  LE: No edema    EKG: EKG personally reviewed    Results for orders placed or performed during the hospital encounter of 06/24/22   EKG, 12 LEAD, INITIAL   Result Value Ref Range    Ventricular Rate 82 BPM    Atrial Rate 82 BPM    P-R Interval 132 ms    QRS Duration 84 ms    Q-T Interval 354 ms    QTC Calculation (Bezet) 413 ms    Calculated P Axis 28 degrees    Calculated R Axis 39 degrees    Calculated T Axis 25 degrees    Diagnosis       Normal sinus rhythm  Poor R-wave Progression  Abnormal ECG  No previous ECGs available  Confirmed by Harman Carter MD., Ainsley Mars ([id number]) on 6/26/2022 7:57:18 PM           Medication Side Effects and Warnings were discussed with patient: yes  Patient Labs were reviewed and/or requested:  yes  Patient Past Records were reviewed and/or requested: yes    Total time:       mins    ATTENTION:   This medical record was transcribed using an electronic medical records/speech recognition system. Although proofread, it may and can contain electronic, spelling and other errors. Corrections may be executed at a later time. Please feel free to contact us for any clarifications as needed.       Ana Hyatt MD

## 2022-06-30 DIAGNOSIS — N52.9 ERECTILE DYSFUNCTION, UNSPECIFIED ERECTILE DYSFUNCTION TYPE: ICD-10-CM

## 2022-07-01 RX ORDER — SILDENAFIL 100 MG/1
TABLET, FILM COATED ORAL
Qty: 8 TABLET | Refills: 22 | Status: SHIPPED | OUTPATIENT
Start: 2022-07-01

## 2022-07-05 ENCOUNTER — ANCILLARY PROCEDURE (OUTPATIENT)
Dept: CARDIOLOGY CLINIC | Age: 57
End: 2022-07-05
Payer: COMMERCIAL

## 2022-07-05 VITALS — HEIGHT: 70 IN | BODY MASS INDEX: 36.22 KG/M2 | WEIGHT: 253 LBS

## 2022-07-05 DIAGNOSIS — R00.2 PALPITATIONS: ICD-10-CM

## 2022-07-05 DIAGNOSIS — R06.02 SHORTNESS OF BREATH: ICD-10-CM

## 2022-07-05 PROCEDURE — 78452 HT MUSCLE IMAGE SPECT MULT: CPT | Performed by: INTERNAL MEDICINE

## 2022-07-05 PROCEDURE — A9500 TC99M SESTAMIBI: HCPCS | Performed by: INTERNAL MEDICINE

## 2022-07-05 PROCEDURE — 93015 CV STRESS TEST SUPVJ I&R: CPT | Performed by: INTERNAL MEDICINE

## 2022-07-05 RX ORDER — TETRAKIS(2-METHOXYISOBUTYLISOCYANIDE)COPPER(I) TETRAFLUOROBORATE 1 MG/ML
30 INJECTION, POWDER, LYOPHILIZED, FOR SOLUTION INTRAVENOUS ONCE
Status: COMPLETED | OUTPATIENT
Start: 2022-07-05 | End: 2022-07-05

## 2022-07-05 RX ORDER — TETRAKIS(2-METHOXYISOBUTYLISOCYANIDE)COPPER(I) TETRAFLUOROBORATE 1 MG/ML
10 INJECTION, POWDER, LYOPHILIZED, FOR SOLUTION INTRAVENOUS ONCE
Status: COMPLETED | OUTPATIENT
Start: 2022-07-05 | End: 2022-07-05

## 2022-07-05 RX ADMIN — TETRAKIS(2-METHOXYISOBUTYLISOCYANIDE)COPPER(I) TETRAFLUOROBORATE 26.2 MILLICURIE: 1 INJECTION, POWDER, LYOPHILIZED, FOR SOLUTION INTRAVENOUS at 14:05

## 2022-07-05 RX ADMIN — TETRAKIS(2-METHOXYISOBUTYLISOCYANIDE)COPPER(I) TETRAFLUOROBORATE 8.7 MILLICURIE: 1 INJECTION, POWDER, LYOPHILIZED, FOR SOLUTION INTRAVENOUS at 13:10

## 2022-07-07 LAB
NUC STRESS EJECTION FRACTION: 64 %
STRESS ANGINA INDEX: 0
STRESS BASELINE DIAS BP: 70 MMHG
STRESS BASELINE HR: 86 BPM
STRESS BASELINE ST DEPRESSION: 0 MM
STRESS BASELINE SYS BP: 110 MMHG
STRESS ESTIMATED WORKLOAD: 9.3 METS
STRESS EXERCISE DUR MIN: 6 MIN
STRESS EXERCISE DUR SEC: 0 SEC
STRESS O2 SAT PEAK: 95 %
STRESS O2 SAT REST: 96 %
STRESS PEAK DIAS BP: 76 MMHG
STRESS PEAK SYS BP: 122 MMHG
STRESS PERCENT HR ACHIEVED: 94 %
STRESS POST PEAK HR: 155 BPM
STRESS RATE PRESSURE PRODUCT: NORMAL BPM*MMHG
STRESS SR DUKE TREADMILL SCORE: 6
STRESS ST DEPRESSION: 0 MM
STRESS TARGET HR: 165 BPM

## 2022-07-13 DIAGNOSIS — Z79.899 ON PRE-EXPOSURE PROPHYLAXIS FOR HIV: Primary | ICD-10-CM

## 2022-07-13 DIAGNOSIS — Z79.899 ON PRE-EXPOSURE PROPHYLAXIS FOR HIV: ICD-10-CM

## 2022-07-13 RX ORDER — EMTRICITABINE AND TENOFOVIR DISOPROXIL FUMARATE 200; 300 MG/1; MG/1
TABLET, FILM COATED ORAL
Qty: 30 TABLET | Refills: 0 | Status: SHIPPED | OUTPATIENT
Start: 2022-07-13 | End: 2022-08-15

## 2022-07-14 NOTE — TELEPHONE ENCOUNTER
Nurse called and spoke with patient regarding message from provider. Patient gave verbal permission for HIV testing. Patient verbalized understanding and was thankful.

## 2022-07-28 NOTE — PROGRESS NOTES
Eleanor Lewis MD., [facility]    [address] [place], [address] [facility]    Office [phone],F [phone]           Mariluz Guzmán is here for a f/u office visit. Primary care physician:  Zena Thomas MD    CC - as documented in EMR    Dear Dr. Zena Thomas MD    I had the pleasure of seeing Mr. Mariluz Guzmán in the office today. 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 Patient is not a smoker. Aggressive cardiovascular risk factor modification including weight loss. Follow-up 4 weeks/earlier as needed  Patient understands the plan. All questions were answered to the patient's satisfaction. I appreciate the opportunity to be involved in . See note below for details. Please do not hesitate to contact us with questions or concerns. Eleanor Lewis MD      Cardiac Testing/ Procedures: A. Cardiac Cath/PCI:    B.ECHO/PEARL: 8/1/22  Left Ventricle: Normal left ventricular systolic function with a visually estimated EF of 60 - 65%. EF by 2D Simpsons Biplane is 60%. Left ventricle size is normal. Normal wall thickness. Normal wall motion. Normal diastolic function. 4/16/21 - ECHO - nml EF    C. StressNuclear/Stress ECHO/Stress test: 7/5/22 - Stress nuc study - 6 min/nml/EF 64%    D. Vascular:    E. EP: 7/30/22    7-day event monitor 7/2/2022-7/8/2022  Minimum heart rate 54 bpm, maximum heart rate 157 bpm  No A. fib/no pauses  Manually detected events-dyspnea associated with sinus rhythm/sinus tachycardia  No significant arrhythmias    F.  Miscellaneous:    History:     Mariluz Guzmán is a 55 y.o. male who returns for follow up visit.    Mild dyspnea on exertion. No chest pain. Initial visit 6/28/22(Patient came to emergency room on 6/24/2022 for dyspnea on exertion. He has been having symptoms for the past 1 to 2 weeks. Occurs especially when he climbs a flight of steps. No chest pressure. Has noticed palpitations. No dizziness or syncope. No prior history of CAD/arrhythmia.)    Former -in [place]. ROS:  (bold if positive, if negative)           Medications:       Current Outpatient Medications   Medication Sig Dispense    emtricitabine-tenofovir, TDF, (TRUVADA) 200-300 mg per tablet TAKE 1 TABLET BY MOUTH EVERY DAY 30 Tablet    sildenafil citrate (VIAGRA) 100 mg tablet TAKE 1 TABLET AS NEEDED FOR ERECTILE DYSFUNCTION. MAX DAILY DOSE 1 TABLET 8 Tablet    traZODone (DESYREL) 100 mg tablet Take 1 Tablet by mouth nightly. Indications: insomnia 30 Tablet    risperiDONE (RisperDAL) 2 mg tablet Take 1 Tablet by mouth daily. 30 Tablet    dextroamphetamine-amphetamine (ADDERALL) 10 mg tablet Take 1 Tablet by mouth daily. Max Daily Amount: 10 mg. 30 Tablet    amphetamine-dextroamphetamine XR (ADDERALL XR) 30 mg XR capsule Take 1 Capsule by mouth every morning. Max Daily Amount: 30 mg. 30 Capsule    levothyroxine (SYNTHROID) 75 mcg tablet TAKE 1 TABLET BY MOUTH EVERY DAY 90 Tablet    rOPINIRole (REQUIP) 3 mg tab tab Take 1 Tablet by mouth nightly. Indications: restless legs syndrome, an extreme discomfort in the calf muscles when sitting or lying down 90 Tablet    ondansetron (ZOFRAN ODT) 4 mg disintegrating tablet Take 1 Tab by mouth every eight (8) hours as needed for Nausea or Vomiting. 15 Tab    escitalopram oxalate (LEXAPRO) 5 mg tablet Take 10 mg by mouth daily. pantoprazole (PROTONIX) 40 mg tablet TAKE 1 TABLET BY MOUTH EVERY DAY     rosuvastatin (CRESTOR) 20 mg tablet TAKE 1 TABLET BY MOUTH EVERY DAY     Multivitamins with Fluoride (MULTI-VITAMIN PO) Take  by mouth.      cholecalciferol (VITAMIN D3) (1000 Units /25 mcg) tablet Take  by mouth daily. Lactobac no.41/Bifidobact no.7 (PROBIOTIC-10 PO) Take  by mouth. MAGNESIUM PO Take 200 mg by mouth. BIOTIN PO Take  by mouth. cycloSPORINE (Restasis) 0.05 % dpet Administer 1 Drop to both eyes every twelve (12) hours. No current facility-administered medications for this visit. Family History of CAD:   No    Social History:  Current  Smoker No    Physical Exam:     Visit Vitals  /76 (BP 1 Location: Left upper arm, BP Patient Position: Sitting)   Pulse 74   Wt 255 lb 9.6 oz (115.9 kg)   SpO2 96%   BMI 36.67 kg/m²          Gen: Well-developed, well-nourished, in no acute distress  Neck: Supple,No JVD, No Carotid Bruit,   Resp: No accessory muscle use, Clear breath sounds, No rales or rhonchi  Card: Regular Rate,Rythm,Normal S1, S2, No murmurs, rubs or gallop. No thrills. Abd:  Soft, non-tender, non-distended,BS+,   MSK: No cyanosis  Skin: No rashes    Neuro: moving all four extremities , follows commands appropriately  Psych:  Good insight, oriented to person, place , alert, Nml Affect  LE: No edema    EKG:       Medication Side Effects and Warnings were discussed with patient: yes  Patient Labs were reviewed and/or requested:  yes  Patient Past Records were reviewed and/or requested: yes    Total time :        mins    ATTENTION:   This medical record was transcribed using an electronic medical records/speech recognition system. Although proofread, it may and can contain electronic, spelling and other errors. Corrections may be executed at a later time. Please feel free to contact us for any clarifications as needed.       Vernestine Mortimer, MD

## 2022-07-30 ENCOUNTER — DOCUMENTATION ONLY (OUTPATIENT)
Dept: CARDIOLOGY CLINIC | Age: 57
End: 2022-07-30

## 2022-07-30 NOTE — PROGRESS NOTES
7-day event monitor 7/2/2022-7/8/2022  Minimum heart rate 54 bpm, maximum heart rate 157 bpm  No A. fib/no pauses  Manually detected events-dyspnea associated with sinus rhythm/sinus tachycardia  No significant arrhythmias  Next appointment 8/1/2022

## 2022-08-01 ENCOUNTER — OFFICE VISIT (OUTPATIENT)
Dept: CARDIOLOGY CLINIC | Age: 57
End: 2022-08-01

## 2022-08-01 ENCOUNTER — ANCILLARY PROCEDURE (OUTPATIENT)
Dept: CARDIOLOGY CLINIC | Age: 57
End: 2022-08-01
Payer: COMMERCIAL

## 2022-08-01 VITALS
DIASTOLIC BLOOD PRESSURE: 76 MMHG | HEIGHT: 70 IN | BODY MASS INDEX: 36.59 KG/M2 | WEIGHT: 255.6 LBS | SYSTOLIC BLOOD PRESSURE: 118 MMHG

## 2022-08-01 VITALS
DIASTOLIC BLOOD PRESSURE: 76 MMHG | BODY MASS INDEX: 36.67 KG/M2 | SYSTOLIC BLOOD PRESSURE: 118 MMHG | OXYGEN SATURATION: 96 % | WEIGHT: 255.6 LBS | HEART RATE: 74 BPM

## 2022-08-01 DIAGNOSIS — R00.2 PALPITATIONS: ICD-10-CM

## 2022-08-01 DIAGNOSIS — R06.02 SHORTNESS OF BREATH: Primary | ICD-10-CM

## 2022-08-01 DIAGNOSIS — R06.02 SHORTNESS OF BREATH: ICD-10-CM

## 2022-08-01 DIAGNOSIS — E66.01 SEVERE OBESITY (BMI 35.0-39.9) WITH COMORBIDITY (HCC): ICD-10-CM

## 2022-08-01 LAB
ECHO AO ASC DIAM: 3.1 CM
ECHO AO ASCENDING AORTA INDEX: 1.34 CM/M2
ECHO AO ROOT DIAM: 3.3 CM
ECHO AO ROOT INDEX: 1.42 CM/M2
ECHO AV AREA PEAK VELOCITY: 2.9 CM2
ECHO AV AREA VTI: 3 CM2
ECHO AV AREA/BSA PEAK VELOCITY: 1.3 CM2/M2
ECHO AV AREA/BSA VTI: 1.3 CM2/M2
ECHO AV MEAN GRADIENT: 4 MMHG
ECHO AV MEAN VELOCITY: 1 M/S
ECHO AV PEAK GRADIENT: 7 MMHG
ECHO AV PEAK VELOCITY: 1.3 M/S
ECHO AV VELOCITY RATIO: 0.69
ECHO AV VTI: 26.8 CM
ECHO EST RA PRESSURE: 3 MMHG
ECHO LA DIAMETER INDEX: 1.68 CM/M2
ECHO LA DIAMETER: 3.9 CM
ECHO LA TO AORTIC ROOT RATIO: 1.18
ECHO LA VOL 2C: 58 ML (ref 18–58)
ECHO LA VOL 4C: 56 ML (ref 18–58)
ECHO LA VOL BP: 59 ML (ref 18–58)
ECHO LA VOL/BSA BIPLANE: 25 ML/M2 (ref 16–34)
ECHO LA VOLUME AREA LENGTH: 62 ML
ECHO LA VOLUME INDEX A2C: 25 ML/M2 (ref 16–34)
ECHO LA VOLUME INDEX A4C: 24 ML/M2 (ref 16–34)
ECHO LA VOLUME INDEX AREA LENGTH: 27 ML/M2 (ref 16–34)
ECHO LV E' LATERAL VELOCITY: 8 CM/S
ECHO LV E' SEPTAL VELOCITY: 6 CM/S
ECHO LV EDV A2C: 81 ML
ECHO LV EDV A4C: 99 ML
ECHO LV EDV BP: 89 ML (ref 67–155)
ECHO LV EDV INDEX A4C: 43 ML/M2
ECHO LV EDV INDEX BP: 38 ML/M2
ECHO LV EDV NDEX A2C: 35 ML/M2
ECHO LV EJECTION FRACTION A2C: 59 %
ECHO LV EJECTION FRACTION A4C: 63 %
ECHO LV EJECTION FRACTION BIPLANE: 60 % (ref 55–100)
ECHO LV ESV A2C: 33 ML
ECHO LV ESV A4C: 37 ML
ECHO LV ESV BP: 36 ML (ref 22–58)
ECHO LV ESV INDEX A2C: 14 ML/M2
ECHO LV ESV INDEX A4C: 16 ML/M2
ECHO LV ESV INDEX BP: 16 ML/M2
ECHO LV FRACTIONAL SHORTENING: 39 % (ref 28–44)
ECHO LV INTERNAL DIMENSION DIASTOLE INDEX: 1.98 CM/M2
ECHO LV INTERNAL DIMENSION DIASTOLIC: 4.6 CM (ref 4.2–5.9)
ECHO LV INTERNAL DIMENSION SYSTOLIC INDEX: 1.21 CM/M2
ECHO LV INTERNAL DIMENSION SYSTOLIC: 2.8 CM
ECHO LV IVSD: 1 CM (ref 0.6–1)
ECHO LV MASS 2D: 148.1 G (ref 88–224)
ECHO LV MASS INDEX 2D: 63.8 G/M2 (ref 49–115)
ECHO LV POSTERIOR WALL DIASTOLIC: 0.9 CM (ref 0.6–1)
ECHO LV RELATIVE WALL THICKNESS RATIO: 0.39
ECHO LVOT AREA: 4.2 CM2
ECHO LVOT AV VTI INDEX: 0.71
ECHO LVOT DIAM: 2.3 CM
ECHO LVOT MEAN GRADIENT: 2 MMHG
ECHO LVOT PEAK GRADIENT: 3 MMHG
ECHO LVOT PEAK VELOCITY: 0.9 M/S
ECHO LVOT STROKE VOLUME INDEX: 34 ML/M2
ECHO LVOT SV: 78.9 ML
ECHO LVOT VTI: 19 CM
ECHO MV A VELOCITY: 0.6 M/S
ECHO MV AREA PHT: 3.9 CM2
ECHO MV E DECELERATION TIME (DT): 196.4 MS
ECHO MV E VELOCITY: 0.54 M/S
ECHO MV E/A RATIO: 0.9
ECHO MV E/E' LATERAL: 6.75
ECHO MV E/E' RATIO (AVERAGED): 7.88
ECHO MV E/E' SEPTAL: 9
ECHO MV PRESSURE HALF TIME (PHT): 56.9 MS
ECHO RIGHT VENTRICULAR SYSTOLIC PRESSURE (RVSP): 21 MMHG
ECHO RV INTERNAL DIMENSION: 3.7 CM
ECHO TV REGURGITANT MAX VELOCITY: 2.15 M/S
ECHO TV REGURGITANT PEAK GRADIENT: 18 MMHG

## 2022-08-01 PROCEDURE — 99214 OFFICE O/P EST MOD 30 MIN: CPT | Performed by: INTERNAL MEDICINE

## 2022-08-01 PROCEDURE — 93306 TTE W/DOPPLER COMPLETE: CPT | Performed by: INTERNAL MEDICINE

## 2022-08-01 NOTE — PROGRESS NOTES
Tasha Page is a 55 y.o. male    Chief Complaint   Patient presents with    Follow-up     Echo today     Palpitations    Shortness of Breath       Chest pain No    SOB with exertions     Dizziness just on episode low blood sugar    Swelling No    Refills No    Visit Vitals  /76 (BP 1 Location: Left upper arm, BP Patient Position: Sitting)   Pulse 74   Wt 255 lb 9.6 oz (115.9 kg)   SpO2 96%   BMI 36.67 kg/m²       1. Have you been to the ER, urgent care clinic since your last visit? Hospitalized since your last visit? No    2. Have you seen or consulted any other health care providers outside of the [address] [place] [address] since your last visit? Include any pap smears or colon screening.  No

## 2022-08-01 NOTE — LETTER
8/1/2022    Patient: Jake Severs   YOB: 1965   Date of Visit: 8/1/2022     Vanessa Lynch, [address] [facility]  [address]  [address] [place] [address]  [facility] [address]    Dear Vanessa Lynch MD,      Thank you for referring Mr. Jake Severs to [facility] for evaluation. My notes for this consultation are attached. If you have questions, please do not hesitate to call me. I look forward to following your patient along with you.       Sincerely,    Gloria Lewis MD

## 2022-08-02 ENCOUNTER — DOCUMENTATION ONLY (OUTPATIENT)
Dept: INTERNAL MEDICINE CLINIC | Age: 57
End: 2022-08-02

## 2022-08-02 DIAGNOSIS — R06.02 SHORTNESS OF BREATH: Primary | ICD-10-CM

## 2022-08-15 DIAGNOSIS — Z79.899 ON PRE-EXPOSURE PROPHYLAXIS FOR HIV: ICD-10-CM

## 2022-08-15 RX ORDER — EMTRICITABINE AND TENOFOVIR DISOPROXIL FUMARATE 200; 300 MG/1; MG/1
TABLET, FILM COATED ORAL
Qty: 30 TABLET | Refills: 0 | Status: SHIPPED | OUTPATIENT
Start: 2022-08-15 | End: 2022-09-12

## 2022-09-11 DIAGNOSIS — G25.81 RESTLESS LEG SYNDROME: ICD-10-CM

## 2022-09-12 DIAGNOSIS — Z79.899 ON PRE-EXPOSURE PROPHYLAXIS FOR HIV: ICD-10-CM

## 2022-09-12 RX ORDER — EMTRICITABINE AND TENOFOVIR DISOPROXIL FUMARATE 200; 300 MG/1; MG/1
TABLET, FILM COATED ORAL
Qty: 30 TABLET | Refills: 0 | Status: SHIPPED | OUTPATIENT
Start: 2022-09-12 | End: 2022-10-09 | Stop reason: SDUPTHER

## 2022-09-12 RX ORDER — ROPINIROLE 3 MG/1
TABLET, FILM COATED ORAL
Qty: 90 TABLET | Refills: 3 | Status: SHIPPED | OUTPATIENT
Start: 2022-09-12

## 2022-09-16 ENCOUNTER — OFFICE VISIT (OUTPATIENT)
Dept: INTERNAL MEDICINE CLINIC | Age: 57
End: 2022-09-16
Payer: COMMERCIAL

## 2022-09-16 VITALS
HEART RATE: 80 BPM | DIASTOLIC BLOOD PRESSURE: 76 MMHG | OXYGEN SATURATION: 96 % | HEIGHT: 70 IN | WEIGHT: 259 LBS | TEMPERATURE: 98.6 F | SYSTOLIC BLOOD PRESSURE: 113 MMHG | BODY MASS INDEX: 37.08 KG/M2 | RESPIRATION RATE: 16 BRPM

## 2022-09-16 DIAGNOSIS — E78.5 HYPERLIPIDEMIA, UNSPECIFIED HYPERLIPIDEMIA TYPE: ICD-10-CM

## 2022-09-16 DIAGNOSIS — R73.03 PREDIABETES: ICD-10-CM

## 2022-09-16 DIAGNOSIS — Z11.59 NEED FOR HEPATITIS C SCREENING TEST: ICD-10-CM

## 2022-09-16 DIAGNOSIS — F31.60 BIPOLAR AFFECTIVE DISORDER, CURRENT EPISODE MIXED, CURRENT EPISODE SEVERITY UNSPECIFIED (HCC): ICD-10-CM

## 2022-09-16 DIAGNOSIS — F98.8 ATTENTION DEFICIT DISORDER, UNSPECIFIED HYPERACTIVITY PRESENCE: ICD-10-CM

## 2022-09-16 DIAGNOSIS — Z12.5 PROSTATE CANCER SCREENING: ICD-10-CM

## 2022-09-16 DIAGNOSIS — Z79.899 ON PRE-EXPOSURE PROPHYLAXIS FOR HIV: Primary | ICD-10-CM

## 2022-09-16 DIAGNOSIS — R06.09 DYSPNEA ON EXERTION: ICD-10-CM

## 2022-09-16 PROCEDURE — 99214 OFFICE O/P EST MOD 30 MIN: CPT | Performed by: INTERNAL MEDICINE

## 2022-09-16 NOTE — ASSESSMENT & PLAN NOTE
monitored by specialist. No acute findings meriting change in the plan   Adderall 30 XR daily  NP at [facility]

## 2022-09-16 NOTE — ASSESSMENT & PLAN NOTE
well controlled, continue current medications   Does not miss doses  Wants to continue medication, bisexual  Check labs  No known exposures

## 2022-09-16 NOTE — ASSESSMENT & PLAN NOTE
monitored by specialist. No acute findings meriting change in the plan   Risperidone and Lexapro  [place] [facility]

## 2022-09-16 NOTE — PROGRESS NOTES
Note   Chief Complaint   Medication follow-up    Hernesto Ignacio is a 55 y.o. male     1. On pre-exposure prophylaxis for HIV  Assessment & Plan:   well controlled, continue current medications   Does not miss doses  Wants to continue medication, bisexual  Check labs  No known exposures  Orders:  -     CT/NG/T.VAGINALIS AMPLIFICATION; Future  -     HIV 1/2 AG/AB, 4TH GENERATION,W RFLX CONFIRM; Future  -     T PALLIDUM SCREEN W/REFLEX; Future  -     HEP B SURFACE AG; Future  -     HBV CORE AB, IGG/IGM; Future  -     HEP B SURFACE AB; Future  2. Hyperlipidemia, unspecified hyperlipidemia type  Assessment & Plan:  Noted previously, recheck  Orders:  -     LIPID PANEL; Future  3. Prediabetes  Assessment & Plan:  Noted previously, recheck  Orders:  -     HEMOGLOBIN A1C WITH EAG; Future  4. Need for hepatitis C screening test  -     HEPATITIS C AB; Future  5. Prostate cancer screening  -     PSA W/ REFLX FREE PSA; Future  6. Bipolar affective disorder, current episode mixed, current episode severity unspecified ([facility] [address]  Assessment & Plan:   monitored by specialist. No acute findings meriting change in the plan   Risperidone and Lexapro  NP at [facility]  7. Attention deficit disorder, unspecified hyperactivity presence  Assessment & Plan:   monitored by specialist. No acute findings meriting change in the plan   Adderall 30 XR daily  NP at [facility] clinic  8. Dyspnea on exertion  Assessment & Plan:   Seen in the emergency room 6/24/2022 for dyspnea on exertion for the past 1-2 weeks. Subsequently seen by cardiology Dr. Stacie Blum. Stress nuclear study normal, echo normal.  7-day monitor with no significant arrhythmias. Was referred to pulmonology for further work-up  Has PFTs planned for Monday  Follow-up with pulmonology. Cardiac work-up negative. Benefits, risks, possible drug interactions, and side effects of all new medications were reviewed with the patient. Pt verbalized understanding.     Return to clinic: 6 months for prep, physical    An electronic signature was used to authenticate this note. Amelia Lopez MD  [facility]  9/16/2022    Future Appointments   Date Time Provider Betina Bobo   2/21/2698  9:61 PM Eva Dominguez MD [facility]   8/4/2023  2:40 PM Edilberto Klein MD [facility]        Objective   Vitals:       Visit Vitals  /76 (BP 1 Location: Left upper arm, BP Patient Position: Sitting, BP Cuff Size: Adult)   Pulse 80   Temp 98.6 °F (37 °C) (Oral)   Resp 16   Ht 5' 10\" (1.778 m)   Wt 259 lb (117.5 kg)   SpO2 96%   BMI 37.16 kg/m²        Physical Exam  Constitutional:       Appearance: Normal appearance. He is not ill-appearing. Cardiovascular:      Rate and Rhythm: Normal rate and regular rhythm. Heart sounds: No murmur heard. No friction rub. No gallop. Pulmonary:      Effort: No respiratory distress. Breath sounds: Normal breath sounds. No wheezing, rhonchi or rales. Neurological:      Mental Status: He is alert. Current Outpatient Medications   Medication Sig    rOPINIRole (REQUIP) 3 mg tab tab TAKE 1 TABLET BY MOUTH NIGHTLY    emtricitabine-tenofovir, TDF, (TRUVADA) 200-300 mg per tablet TAKE 1 TABLET BY MOUTH EVERY DAY    sildenafil citrate (VIAGRA) 100 mg tablet TAKE 1 TABLET AS NEEDED FOR ERECTILE DYSFUNCTION. MAX DAILY DOSE 1 TABLET    traZODone (DESYREL) 100 mg tablet Take 1 Tablet by mouth nightly. Indications: insomnia    risperiDONE (RisperDAL) 2 mg tablet Take 1 Tablet by mouth daily. dextroamphetamine-amphetamine (ADDERALL) 10 mg tablet Take 1 Tablet by mouth daily. Max Daily Amount: 10 mg.    amphetamine-dextroamphetamine XR (ADDERALL XR) 30 mg XR capsule Take 1 Capsule by mouth every morning.  Max Daily Amount: 30 mg.    levothyroxine (SYNTHROID) 75 mcg tablet TAKE 1 TABLET BY MOUTH EVERY DAY    ondansetron (ZOFRAN ODT) 4 mg disintegrating tablet Take 1 Tab by mouth every eight (8) hours as needed for Nausea or Vomiting.    escitalopram oxalate (LEXAPRO) 5 mg tablet Take 10 mg by mouth daily. pantoprazole (PROTONIX) 40 mg tablet TAKE 1 TABLET BY MOUTH EVERY DAY    rosuvastatin (CRESTOR) 20 mg tablet TAKE 1 TABLET BY MOUTH EVERY DAY    Multivitamins with Fluoride (MULTI-VITAMIN PO) Take  by mouth. cholecalciferol (VITAMIN D3) (1000 Units /25 mcg) tablet Take  by mouth daily. Lactobac no.41/Bifidobact no.7 (PROBIOTIC-10 PO) Take  by mouth. MAGNESIUM PO Take 200 mg by mouth. BIOTIN PO Take  by mouth. cycloSPORINE (Restasis) 0.05 % dpet Administer 1 Drop to both eyes every twelve (12) hours. No current facility-administered medications for this visit.

## 2022-09-16 NOTE — ASSESSMENT & PLAN NOTE
Seen in the emergency room 6/24/2022 for dyspnea on exertion for the past 1-2 weeks. Subsequently seen by cardiology Dr. Vladimir Jimenez. Stress nuclear study normal, echo normal.  7-day monitor with no significant arrhythmias. Was referred to pulmonology for further work-up  Has PFTs planned for Monday  Follow-up with pulmonology. Cardiac work-up negative.

## 2022-10-07 LAB
C TRACH RRNA SPEC QL NAA+PROBE: NEGATIVE
CHOLEST SERPL-MCNC: 134 MG/DL (ref 100–199)
EST. AVERAGE GLUCOSE BLD GHB EST-MCNC: 126 MG/DL
HBA1C MFR BLD: 6 % (ref 4.8–5.6)
HBV CORE AB SERPL QL IA: NEGATIVE
HBV CORE IGM SERPL QL IA: NEGATIVE
HBV SURFACE AB SER QL: NON REACTIVE
HBV SURFACE AG SERPL QL IA: NEGATIVE
HCV AB S/CO SERPL IA: <0.1 S/CO RATIO (ref 0–0.9)
HDLC SERPL-MCNC: 50 MG/DL
HIV 1+2 AB+HIV1 P24 AG SERPL QL IA: NON REACTIVE
IMP & REVIEW OF LAB RESULTS: NORMAL
LDLC SERPL CALC-MCNC: 57 MG/DL (ref 0–99)
N GONORRHOEA RRNA SPEC QL NAA+PROBE: NEGATIVE
PSA SERPL-MCNC: 1.3 NG/ML (ref 0–4)
REFLEX CRITERIA: NORMAL
T VAGINALIS RRNA SPEC QL NAA+PROBE: NEGATIVE
TREPONEMA PALLIDUM IGG+IGM AB [PRESENCE] IN SERUM OR PLASMA BY IMMUNOASSAY: NON REACTIVE
TRIGL SERPL-MCNC: 159 MG/DL (ref 0–149)
VLDLC SERPL CALC-MCNC: 27 MG/DL (ref 5–40)

## 2022-10-09 DIAGNOSIS — Z79.899 ON PRE-EXPOSURE PROPHYLAXIS FOR HIV: ICD-10-CM

## 2022-10-09 RX ORDER — EMTRICITABINE AND TENOFOVIR DISOPROXIL FUMARATE 200; 300 MG/1; MG/1
1 TABLET, FILM COATED ORAL DAILY
Qty: 30 TABLET | Refills: 2 | Status: SHIPPED | OUTPATIENT
Start: 2022-10-09

## 2022-10-17 NOTE — PROGRESS NOTES
[employer] message sent. Chlamydia, gonorrhea, trichomonas negative. HIV negative. Syphilis negative. Hep B negative. Not immune to hepatitis B. Triglyceride 159. LDL 57. A1c 6%. Hep C negative. PSA normal    STD testing negative. Not immune to hepatitis B. Offer vaccination if has not already had 2 series.   LDL at goal.  Continue  Prediabetic, monitor

## 2022-11-09 ENCOUNTER — PATIENT MESSAGE (OUTPATIENT)
Dept: INTERNAL MEDICINE CLINIC | Age: 57
End: 2022-11-09

## 2022-11-09 RX ORDER — PANTOPRAZOLE SODIUM 40 MG/1
40 TABLET, DELAYED RELEASE ORAL DAILY
Qty: 90 TABLET | Refills: 3 | Status: SHIPPED | OUTPATIENT
Start: 2022-11-09

## 2022-11-09 NOTE — TELEPHONE ENCOUNTER
From: Ling Ochoa  To: Leah Sawyer MD  Sent: 41/3/5816 2:05 PM EST  Subject: Pantoprozal refill    Dr Brook Hawkins I need to get my pantoprazole refilled. Im doing fin on it. The dose works well and Im not having any issues.  Thank you     Ling Ochoa

## 2022-12-02 ENCOUNTER — OFFICE VISIT (OUTPATIENT)
Dept: INTERNAL MEDICINE CLINIC | Age: 57
End: 2022-12-02
Payer: COMMERCIAL

## 2022-12-02 VITALS
BODY MASS INDEX: 37.97 KG/M2 | DIASTOLIC BLOOD PRESSURE: 72 MMHG | WEIGHT: 265.2 LBS | SYSTOLIC BLOOD PRESSURE: 109 MMHG | OXYGEN SATURATION: 98 % | HEART RATE: 79 BPM | HEIGHT: 70 IN | TEMPERATURE: 98.3 F | RESPIRATION RATE: 14 BRPM

## 2022-12-02 DIAGNOSIS — E03.9 ACQUIRED HYPOTHYROIDISM: ICD-10-CM

## 2022-12-02 DIAGNOSIS — Z79.899 ON PRE-EXPOSURE PROPHYLAXIS FOR HIV: ICD-10-CM

## 2022-12-02 DIAGNOSIS — R14.0 ABDOMINAL DISTENTION: Primary | ICD-10-CM

## 2022-12-02 DIAGNOSIS — R06.09 DYSPNEA ON EXERTION: ICD-10-CM

## 2022-12-02 RX ORDER — BUDESONIDE AND FORMOTEROL FUMARATE DIHYDRATE 80; 4.5 UG/1; UG/1
AEROSOL RESPIRATORY (INHALATION)
COMMUNITY
Start: 2022-11-23

## 2022-12-02 NOTE — PROGRESS NOTES
Note   Chief Complaint   Abdominal distention    Ling Ochoa is a 65 y.o. male     Accompanied by family member    3. Abdominal distention  Assessment & Plan:  Increasing abdominal distention over the past 1.5mo   Legs are more swollen   No nausea, vomiting, diarrhea  Mild constipation  No fevers, chills   No blood in stools  No shakes/tremors  Echo 8/2022 - EF 60-65%  Unclear etiology. Recommend checking CT abdomen and pelvis and labs  Patient has a contrast allergy, so getting CT without contrast  Orders:  -     CBC W/O DIFF; Future  -     METABOLIC PANEL, COMPREHENSIVE; Future  -     CT ABD PELV WO CONT; Future  2. Acquired hypothyroidism  Assessment & Plan:   unclear control, continue current medications pending work up below   Levothyroxine 75  Orders:  -     TSH 3RD GENERATION; Future  -     T4, FREE; Future  3. Dyspnea on exertion  Assessment & Plan:   Worsening since last visit  Takes deep breaths during visit, hard to tolerate walking around in the store  Pfts normal per patient   Pulm started symbicort 2 weeks ago   No difference noted with Symbicort  Previous cardiac work-up with stress nuclear test, echo, and monitor was negative  Denies chest pain  Was having shortness of breath prior to onset of abdominal distention  Unclear etiology of symptoms. Recommend CT chest  Orders:  -     CT CHEST WO CONT; Future  4. On pre-exposure prophylaxis for HIV  Assessment & Plan:  Patient reports he does not feel he needs the medication anymore, not engaging in any high risk activities  Stop Truvada       Benefits, risks, possible drug interactions, and side effects of all new medications were reviewed with the patient. Pt verbalized understanding. Return to clinic: Pending CT results    An electronic signature was used to authenticate this note.   Leah Sawyer MD  [facility]  12/2/2022    Future Appointments   Date Time Provider Betina Bobo   12/9/2022  8:00 AM [facility] [place]   2/71/3621  3:87 PM Amber Coffey MD [facility] AM[facility]   8/4/2023  2:40 PM Karl Klein MD [facility] AMB        Objective   Vitals:       Visit Vitals  /72 (BP 1 Location: Left upper arm, BP Patient Position: Sitting, BP Cuff Size: Small adult)   Pulse 79   Temp 98.3 °F (36.8 °C) (Oral)   Resp 14   Ht 5' 10\" (1.778 m)   Wt 265 lb 3.2 oz (120.3 kg)   SpO2 98%   BMI 38.05 kg/m²        Physical Exam  Constitutional:       Appearance: Normal appearance. He is not ill-appearing. Cardiovascular:      Rate and Rhythm: Normal rate and regular rhythm. Heart sounds: No murmur heard. No friction rub. No gallop. Pulmonary:      Effort: No respiratory distress. Breath sounds: Normal breath sounds. No wheezing, rhonchi or rales. Abdominal:      General: Bowel sounds are normal. There is distension (questionable fluid wave). Palpations: Abdomen is soft. There is no mass. Tenderness: There is no abdominal tenderness. There is no guarding. Hernia: No hernia is present. Musculoskeletal:      Comments: Trace to 1+ pitting edema BL LE   Neurological:      Mental Status: He is alert. Current Outpatient Medications   Medication Sig    Symbicort 80-4.5 mcg/actuation HFAA INHALE 2 PUFFS BY MOUTH TWICE A DAY    pantoprazole (PROTONIX) 40 mg tablet Take 1 Tablet by mouth daily. Indications: gastroesophageal reflux disease    rOPINIRole (REQUIP) 3 mg tab tab TAKE 1 TABLET BY MOUTH NIGHTLY    sildenafil citrate (VIAGRA) 100 mg tablet TAKE 1 TABLET AS NEEDED FOR ERECTILE DYSFUNCTION. MAX DAILY DOSE 1 TABLET    traZODone (DESYREL) 100 mg tablet Take 1 Tablet by mouth nightly. Indications: insomnia    risperiDONE (RisperDAL) 2 mg tablet Take 1 Tablet by mouth daily. dextroamphetamine-amphetamine (ADDERALL) 10 mg tablet Take 1 Tablet by mouth daily.  Max Daily Amount: 10 mg.    amphetamine-dextroamphetamine XR (ADDERALL XR) 30 mg XR capsule Take 1 Capsule by mouth every morning. Max Daily Amount: 30 mg.    levothyroxine (SYNTHROID) 75 mcg tablet TAKE 1 TABLET BY MOUTH EVERY DAY    escitalopram oxalate (LEXAPRO) 5 mg tablet Take 10 mg by mouth daily. rosuvastatin (CRESTOR) 20 mg tablet TAKE 1 TABLET BY MOUTH EVERY DAY    Multivitamins with Fluoride (MULTI-VITAMIN PO) Take  by mouth. cholecalciferol (VITAMIN D3) (1000 Units /25 mcg) tablet Take  by mouth daily. Lactobac no.41/Bifidobact no.7 (PROBIOTIC-10 PO) Take  by mouth. MAGNESIUM PO Take 200 mg by mouth. BIOTIN PO Take  by mouth. cycloSPORINE (Restasis) 0.05 % dpet Administer 1 Drop to both eyes every twelve (12) hours. ondansetron (ZOFRAN ODT) 4 mg disintegrating tablet Take 1 Tab by mouth every eight (8) hours as needed for Nausea or Vomiting. (Patient not taking: Reported on 12/2/2022)     No current facility-administered medications for this visit.

## 2022-12-02 NOTE — ASSESSMENT & PLAN NOTE
Worsening since last visit  Takes deep breaths during visit, hard to tolerate walking around in the store  Pfts normal per patient   Pulm started symbicort 2 weeks ago   No difference noted with Symbicort  Previous cardiac work-up with stress nuclear test, echo, and monitor was negative  Denies chest pain  Was having shortness of breath prior to onset of abdominal distention  Unclear etiology of symptoms.   Recommend CT chest

## 2022-12-02 NOTE — ASSESSMENT & PLAN NOTE
Patient reports he does not feel he needs the medication anymore, not engaging in any high risk activities  Stop Truvada

## 2022-12-02 NOTE — ASSESSMENT & PLAN NOTE
Increasing abdominal distention over the past 1.5mo   Legs are more swollen   No nausea, vomiting, diarrhea  Mild constipation  No fevers, chills   No blood in stools  No shakes/tremors  Echo 8/2022 - EF 60-65%  Unclear etiology.   Recommend checking CT abdomen and pelvis and labs  Patient has a contrast allergy, so getting CT without contrast

## 2022-12-04 LAB
ALBUMIN SERPL-MCNC: 4.4 G/DL (ref 3.8–4.9)
ALBUMIN/GLOB SERPL: 1.8 {RATIO} (ref 1.2–2.2)
ALP SERPL-CCNC: 85 IU/L (ref 44–121)
ALT SERPL-CCNC: 18 IU/L (ref 0–44)
AST SERPL-CCNC: 22 IU/L (ref 0–40)
BILIRUB SERPL-MCNC: 0.4 MG/DL (ref 0–1.2)
BUN SERPL-MCNC: 10 MG/DL (ref 6–24)
BUN/CREAT SERPL: 7 (ref 9–20)
CALCIUM SERPL-MCNC: 9.7 MG/DL (ref 8.7–10.2)
CHLORIDE SERPL-SCNC: 102 MMOL/L (ref 96–106)
CO2 SERPL-SCNC: 23 MMOL/L (ref 20–29)
CREAT SERPL-MCNC: 1.48 MG/DL (ref 0.76–1.27)
EGFR: 55 ML/MIN/1.73
ERYTHROCYTE [DISTWIDTH] IN BLOOD BY AUTOMATED COUNT: 13.6 % (ref 11.6–15.4)
GLOBULIN SER CALC-MCNC: 2.4 G/DL (ref 1.5–4.5)
GLUCOSE SERPL-MCNC: 96 MG/DL (ref 70–99)
HCT VFR BLD AUTO: 47.4 % (ref 37.5–51)
HGB BLD-MCNC: 15.4 G/DL (ref 13–17.7)
INTERPRETATION: NORMAL
MCH RBC QN AUTO: 29.7 PG (ref 26.6–33)
MCHC RBC AUTO-ENTMCNC: 32.5 G/DL (ref 31.5–35.7)
MCV RBC AUTO: 92 FL (ref 79–97)
PLATELET # BLD AUTO: 200 X10E3/UL (ref 150–450)
POTASSIUM SERPL-SCNC: 3.9 MMOL/L (ref 3.5–5.2)
PROT SERPL-MCNC: 6.8 G/DL (ref 6–8.5)
RBC # BLD AUTO: 5.18 X10E6/UL (ref 4.14–5.8)
SODIUM SERPL-SCNC: 142 MMOL/L (ref 134–144)
T4 FREE SERPL-MCNC: 1.42 NG/DL (ref 0.82–1.77)
TSH SERPL DL<=0.005 MIU/L-ACNC: 3.75 UIU/ML (ref 0.45–4.5)
WBC # BLD AUTO: 10.6 X10E3/UL (ref 3.4–10.8)

## 2022-12-07 ENCOUNTER — TELEPHONE (OUTPATIENT)
Dept: INTERNAL MEDICINE CLINIC | Age: 57
End: 2022-12-07

## 2022-12-07 NOTE — TELEPHONE ENCOUNTER
----- Message from Crow Reynaga MD sent at 12/6/2022  6:39 PM EST -----  Hi, Dr. Marija Mattson wanted me to follow-up on his imaging.   Do you know if he did this?  thanks

## 2022-12-09 ENCOUNTER — HOSPITAL ENCOUNTER (OUTPATIENT)
Dept: CT IMAGING | Age: 57
Discharge: HOME | End: 2022-12-09
Attending: INTERNAL MEDICINE

## 2022-12-09 DIAGNOSIS — R06.09 DYSPNEA ON EXERTION: ICD-10-CM

## 2022-12-12 NOTE — PROGRESS NOTES
[employer] message sent. CBC normal.  Creatinine 1.48.   CMP normal.  TSH normal.  Free T4 normal    Monitor creatinine, better than previous baseline

## 2022-12-14 ENCOUNTER — HOSPITAL ENCOUNTER (OUTPATIENT)
Dept: CT IMAGING | Age: 57
Discharge: HOME | End: 2022-12-14
Attending: INTERNAL MEDICINE
Payer: COMMERCIAL

## 2022-12-14 DIAGNOSIS — R14.0 ABDOMINAL DISTENTION: ICD-10-CM

## 2022-12-14 PROCEDURE — 71250 CT THORAX DX C-: CPT

## 2022-12-14 PROCEDURE — 74176 CT ABD & PELVIS W/O CONTRAST: CPT

## 2022-12-26 ENCOUNTER — PATIENT MESSAGE (OUTPATIENT)
Dept: INTERNAL MEDICINE CLINIC | Age: 57
End: 2022-12-26

## 2022-12-27 RX ORDER — ROSUVASTATIN CALCIUM 20 MG/1
20 TABLET, COATED ORAL DAILY
Qty: 90 TABLET | Refills: 3 | Status: SHIPPED | OUTPATIENT
Start: 2022-12-27

## 2022-12-27 NOTE — TELEPHONE ENCOUNTER
From: Kell Booth  To: Marcela Arias MD  Sent: 50/53/6077 12:10 PM EST  Subject: Prescription refill     Dr Steve Scott I need me rouvastatin refilled please.     Kell Booth

## 2023-01-09 ENCOUNTER — TELEPHONE (OUTPATIENT)
Dept: INTERNAL MEDICINE CLINIC | Age: 58
End: 2023-01-09

## 2023-01-09 NOTE — TELEPHONE ENCOUNTER
Nurse called and advised that Dr Sachin Arciniega is not accepting any new patients at this time. Patient was thankful.

## 2023-01-16 ENCOUNTER — OFFICE VISIT (OUTPATIENT)
Dept: INTERNAL MEDICINE CLINIC | Age: 58
End: 2023-01-16
Payer: COMMERCIAL

## 2023-01-16 VITALS
HEART RATE: 85 BPM | SYSTOLIC BLOOD PRESSURE: 118 MMHG | WEIGHT: 275 LBS | OXYGEN SATURATION: 94 % | BODY MASS INDEX: 39.37 KG/M2 | TEMPERATURE: 98.3 F | HEIGHT: 70 IN | DIASTOLIC BLOOD PRESSURE: 77 MMHG | RESPIRATION RATE: 15 BRPM

## 2023-01-16 DIAGNOSIS — F31.60 BIPOLAR AFFECTIVE DISORDER, CURRENT EPISODE MIXED, CURRENT EPISODE SEVERITY UNSPECIFIED (HCC): ICD-10-CM

## 2023-01-16 DIAGNOSIS — E03.9 ACQUIRED HYPOTHYROIDISM: ICD-10-CM

## 2023-01-16 DIAGNOSIS — G30.0 EARLY ONSET ALZHEIMER'S DEMENTIA WITHOUT BEHAVIORAL DISTURBANCE (HCC): ICD-10-CM

## 2023-01-16 DIAGNOSIS — N18.31 STAGE 3A CHRONIC KIDNEY DISEASE (HCC): ICD-10-CM

## 2023-01-16 DIAGNOSIS — F02.80 EARLY ONSET ALZHEIMER'S DEMENTIA WITHOUT BEHAVIORAL DISTURBANCE (HCC): ICD-10-CM

## 2023-01-16 DIAGNOSIS — R06.09 DYSPNEA ON EXERTION: Primary | ICD-10-CM

## 2023-01-16 PROCEDURE — 99214 OFFICE O/P EST MOD 30 MIN: CPT | Performed by: INTERNAL MEDICINE

## 2023-01-16 RX ORDER — LEVOTHYROXINE SODIUM 75 UG/1
TABLET ORAL
Qty: 90 TABLET | Refills: 3 | Status: SHIPPED | OUTPATIENT
Start: 2023-01-16

## 2023-01-16 NOTE — ASSESSMENT & PLAN NOTE
Persistent BRUNO  CT c/a/p unrevealing  Getting lightheadedness after episodes of SOB - \"feeling drunk\"  Drops into 80s when sleeping, watch notes range 86-99%; was having symptoms during his visit - O2 95-87% while talking  Symbicort doesn't make a difference  Cough is worse   Can sometimes walk without BRUNO  Always has BRUNO when doing up stairs   Had episode of going upstairs - SOB came on and then dizziness lasted for 2 hours   Had episode while eating - was sitting and developed SOB, had dizziness after   They don't feel like ankle swelling is any different from his baseline before his symptoms  Symptoms started prior to weight gain   Has coughing fits, dry, has tried albuterol during these episodes and doesn't seem to make a difference   Unclear etiology of symptoms. CBC normal.  Previous stress and echo normal, PFTs normal (per pt). He has an appt with Dr. Uli Lyn with pulm next week. Advised pt to follow up then send me a message to let me know what they are planning. Consider consulting with cards again given persistent symptoms. We discussed lasix trial due to leg swelling to see if that makes a difference; decided to wait until pulm appt. Former  - exposure? Obesity hypoventilation?

## 2023-01-16 NOTE — PROGRESS NOTES
Note   Chief Complaint   Shortness of breath    Namrata Deluca is a 65 y.o. male     Accompanied by partner (who works with Dr. Solis Perez with [facility])    1. Dyspnea on exertion  Assessment & Plan:    Persistent BRUNO  CT c/a/p unrevealing  Getting lightheadedness after episodes of SOB - \"feeling drunk\"  Drops into 80s when sleeping, watch notes range 86-99%; was having symptoms during his visit - O2 95-87% while talking  Symbicort doesn't make a difference  Cough is worse   Can sometimes walk without BRUNO  Always has BRUNO when doing up stairs   Had episode of going upstairs - SOB came on and then dizziness lasted for 2 hours   Had episode while eating - was sitting and developed SOB, had dizziness after   They don't feel like ankle swelling is any different from his baseline before his symptoms  Symptoms started prior to weight gain   Has coughing fits, dry, has tried albuterol during these episodes and doesn't seem to make a difference   Unclear etiology of symptoms. CBC normal.  Previous stress and echo normal, PFTs normal (per pt). He has an appt with Dr. Solis Perez with [facility] next week. Advised pt to follow up then send me a message to let me know what they are planning. Consider consulting with cards again given persistent symptoms. We discussed lasix trial due to leg swelling to see if that makes a difference; decided to wait until [facility] appt. Former  - exposure? Obesity hypoventilation? 2. Early onset Alzheimer's dementia without behavioral disturbance ([address]  Assessment & Plan:   Stable, monitor  3. Bipolar affective disorder, current episode mixed, current episode severity unspecified ([address]  Assessment & Plan:   monitored by specialist. No acute findings meriting change in the plan  4. Stage 3a chronic kidney disease (HCC)  Assessment & Plan:  Stable, monitor       Benefits, risks, possible drug interactions, and side effects of all new medications were reviewed with the patient.  Pt verbalized understanding. Return to clinic: Pending pulm appointment    An electronic signature was used to authenticate this note. Dev Darnell MD  [facility]  1/16/2023    Future Appointments   Date Time Provider Betina Bobo   7/79/8670  7:25 PM Matthew Griffin MD [facility] BS AMB   8/4/2023  2:40 PM Prabhak[facility]        Objective   Vitals:       Visit Vitals  /77 (BP 1 Location: Left upper arm, BP Patient Position: Sitting, BP Cuff Size: Adult)   Pulse 85   Temp 98.3 °F (36.8 °C) (Oral)   Resp 15   Ht 5' 10\" (1.778 m)   Wt 275 lb (124.7 kg)   SpO2 94%   BMI 39.46 kg/m²        Physical Exam  Constitutional:       Appearance: Normal appearance. He is not ill-appearing. Cardiovascular:      Rate and Rhythm: Normal rate and regular rhythm. Heart sounds: No murmur heard. No friction rub. No gallop. Pulmonary:      Effort: No respiratory distress. Breath sounds: Normal breath sounds. No wheezing, rhonchi or rales. Musculoskeletal:      Comments: 1+ pitting edema bilateral lower extremity   Neurological:      Mental Status: He is alert. Current Outpatient Medications   Medication Sig    rosuvastatin (CRESTOR) 20 mg tablet Take 1 Tablet by mouth daily. Indications: high cholesterol    Symbicort 80-4.5 mcg/actuation HFAA INHALE 2 PUFFS BY MOUTH TWICE A DAY    pantoprazole (PROTONIX) 40 mg tablet Take 1 Tablet by mouth daily. Indications: gastroesophageal reflux disease    rOPINIRole (REQUIP) 3 mg tab tab TAKE 1 TABLET BY MOUTH NIGHTLY    sildenafil citrate (VIAGRA) 100 mg tablet TAKE 1 TABLET AS NEEDED FOR ERECTILE DYSFUNCTION. MAX DAILY DOSE 1 TABLET    traZODone (DESYREL) 100 mg tablet Take 1 Tablet by mouth nightly. Indications: insomnia    risperiDONE (RisperDAL) 2 mg tablet Take 1 Tablet by mouth daily. dextroamphetamine-amphetamine (ADDERALL) 10 mg tablet Take 1 Tablet by mouth daily. Max Daily Amount: 10 mg. amphetamine-dextroamphetamine XR (ADDERALL XR) 30 mg XR capsule Take 1 Capsule by mouth every morning. Max Daily Amount: 30 mg.    levothyroxine (SYNTHROID) 75 mcg tablet TAKE 1 TABLET BY MOUTH EVERY DAY    ondansetron (ZOFRAN ODT) 4 mg disintegrating tablet Take 1 Tab by mouth every eight (8) hours as needed for Nausea or Vomiting.    escitalopram oxalate (LEXAPRO) 5 mg tablet Take 10 mg by mouth daily. Multivitamins with Fluoride (MULTI-VITAMIN PO) Take  by mouth. cholecalciferol (VITAMIN D3) (1000 Units /25 mcg) tablet Take  by mouth daily. Lactobac no.41/Bifidobact no.7 (PROBIOTIC-10 PO) Take  by mouth. MAGNESIUM PO Take 200 mg by mouth. BIOTIN PO Take  by mouth. cycloSPORINE (Restasis) 0.05 % dpet Administer 1 Drop to both eyes every twelve (12) hours. No current facility-administered medications for this visit.

## 2023-02-02 DIAGNOSIS — R39.15 URINARY URGENCY: ICD-10-CM

## 2023-02-03 RX ORDER — TAMSULOSIN HYDROCHLORIDE 0.4 MG/1
CAPSULE ORAL
Qty: 90 CAPSULE | Refills: 3 | Status: SHIPPED | OUTPATIENT
Start: 2023-02-03

## 2023-03-13 ENCOUNTER — HOSPITAL ENCOUNTER (OUTPATIENT)
Age: 58
Setting detail: OUTPATIENT SURGERY
Discharge: HOME | End: 2023-03-13
Attending: INTERNAL MEDICINE | Admitting: INTERNAL MEDICINE
Payer: COMMERCIAL

## 2023-03-13 VITALS
SYSTOLIC BLOOD PRESSURE: 127 MMHG | HEIGHT: 70 IN | DIASTOLIC BLOOD PRESSURE: 89 MMHG | TEMPERATURE: 98 F | BODY MASS INDEX: 38.65 KG/M2 | HEART RATE: 94 BPM | WEIGHT: 270 LBS | RESPIRATION RATE: 19 BRPM | OXYGEN SATURATION: 92 %

## 2023-03-13 DIAGNOSIS — R06.00 DYSPNEA, UNSPECIFIED TYPE: ICD-10-CM

## 2023-03-13 LAB
ANION GAP SERPL CALC-SCNC: 6 MMOL/L (ref 5–15)
BUN SERPL-MCNC: 22 MG/DL (ref 6–20)
BUN/CREAT SERPL: 16 (ref 12–20)
CALCIUM SERPL-MCNC: 9.6 MG/DL (ref 8.5–10.1)
CHLORIDE SERPL-SCNC: 110 MMOL/L (ref 97–108)
CO2 SERPL-SCNC: 25 MMOL/L (ref 21–32)
CREAT SERPL-MCNC: 1.41 MG/DL (ref 0.7–1.3)
ERYTHROCYTE [DISTWIDTH] IN BLOOD BY AUTOMATED COUNT: 13.5 % (ref 11.5–14.5)
GLUCOSE SERPL-MCNC: 143 MG/DL (ref 65–100)
HCT VFR BLD AUTO: 49.4 % (ref 36.6–50.3)
HGB BLD-MCNC: 15.4 G/DL (ref 12.1–17)
MCH RBC QN AUTO: 27.9 PG (ref 26–34)
MCHC RBC AUTO-ENTMCNC: 31.2 G/DL (ref 30–36.5)
MCV RBC AUTO: 89.5 FL (ref 80–99)
NRBC # BLD: 0 K/UL (ref 0–0.01)
NRBC BLD-RTO: 0 PER 100 WBC
PLATELET # BLD AUTO: 215 K/UL (ref 150–400)
PMV BLD AUTO: 12 FL (ref 8.9–12.9)
POTASSIUM SERPL-SCNC: 4.4 MMOL/L (ref 3.5–5.1)
RBC # BLD AUTO: 5.52 M/UL (ref 4.1–5.7)
SODIUM SERPL-SCNC: 141 MMOL/L (ref 136–145)
WBC # BLD AUTO: 14.5 K/UL (ref 4.1–11.1)

## 2023-03-13 PROCEDURE — 2709999900 HC NON-CHARGEABLE SUPPLY: Performed by: INTERNAL MEDICINE

## 2023-03-13 PROCEDURE — C1894 INTRO/SHEATH, NON-LASER: HCPCS | Performed by: INTERNAL MEDICINE

## 2023-03-13 PROCEDURE — C1751 CATH, INF, PER/CENT/MIDLINE: HCPCS | Performed by: INTERNAL MEDICINE

## 2023-03-13 PROCEDURE — 99152 MOD SED SAME PHYS/QHP 5/>YRS: CPT | Performed by: INTERNAL MEDICINE

## 2023-03-13 PROCEDURE — 77030041064 HC CATH DX ANGI DXTRTY MEDT -B: Performed by: INTERNAL MEDICINE

## 2023-03-13 PROCEDURE — 93460 R&L HRT ART/VENTRICLE ANGIO: CPT | Performed by: INTERNAL MEDICINE

## 2023-03-13 PROCEDURE — 99153 MOD SED SAME PHYS/QHP EA: CPT | Performed by: INTERNAL MEDICINE

## 2023-03-13 PROCEDURE — 77030013744: Performed by: INTERNAL MEDICINE

## 2023-03-13 PROCEDURE — 74011250636 HC RX REV CODE- 250/636: Performed by: INTERNAL MEDICINE

## 2023-03-13 PROCEDURE — 77030019569 HC BND COMPR RAD TERU -B: Performed by: INTERNAL MEDICINE

## 2023-03-13 PROCEDURE — 74011000250 HC RX REV CODE- 250: Performed by: INTERNAL MEDICINE

## 2023-03-13 PROCEDURE — 76937 US GUIDE VASCULAR ACCESS: CPT | Performed by: INTERNAL MEDICINE

## 2023-03-13 PROCEDURE — 85027 COMPLETE CBC AUTOMATED: CPT

## 2023-03-13 PROCEDURE — 74011000636 HC RX REV CODE- 636: Performed by: INTERNAL MEDICINE

## 2023-03-13 PROCEDURE — 36415 COLL VENOUS BLD VENIPUNCTURE: CPT

## 2023-03-13 PROCEDURE — 80048 BASIC METABOLIC PNL TOTAL CA: CPT

## 2023-03-13 RX ORDER — SODIUM CHLORIDE 0.9 % (FLUSH) 0.9 %
5-40 SYRINGE (ML) INJECTION AS NEEDED
Status: DISCONTINUED | OUTPATIENT
Start: 2023-03-13 | End: 2023-03-13 | Stop reason: HOSPADM

## 2023-03-13 RX ORDER — DIPHENHYDRAMINE HYDROCHLORIDE 50 MG/ML
25 INJECTION, SOLUTION INTRAMUSCULAR; INTRAVENOUS
Status: DISCONTINUED | OUTPATIENT
Start: 2023-03-13 | End: 2023-03-13 | Stop reason: HOSPADM

## 2023-03-13 RX ORDER — LIDOCAINE HYDROCHLORIDE 10 MG/ML
INJECTION INFILTRATION; PERINEURAL AS NEEDED
Status: DISCONTINUED | OUTPATIENT
Start: 2023-03-13 | End: 2023-03-13 | Stop reason: HOSPADM

## 2023-03-13 RX ORDER — NALOXONE HYDROCHLORIDE 0.4 MG/ML
0.4 INJECTION, SOLUTION INTRAMUSCULAR; INTRAVENOUS; SUBCUTANEOUS AS NEEDED
Status: DISCONTINUED | OUTPATIENT
Start: 2023-03-13 | End: 2023-03-13 | Stop reason: HOSPADM

## 2023-03-13 RX ORDER — SODIUM CHLORIDE 9 MG/ML
50 INJECTION, SOLUTION INTRAVENOUS CONTINUOUS
Status: DISCONTINUED | OUTPATIENT
Start: 2023-03-13 | End: 2023-03-13 | Stop reason: HOSPADM

## 2023-03-13 RX ORDER — HEPARIN SODIUM 1000 [USP'U]/ML
INJECTION, SOLUTION INTRAVENOUS; SUBCUTANEOUS AS NEEDED
Status: DISCONTINUED | OUTPATIENT
Start: 2023-03-13 | End: 2023-03-13 | Stop reason: HOSPADM

## 2023-03-13 RX ORDER — CYANOCOBALAMIN (VITAMIN B-12) 1000MCG/ML
1000 DROPS ORAL 2 TIMES DAILY
Qty: 60 TABLET | Refills: 5 | Status: SHIPPED | OUTPATIENT
Start: 2023-03-13

## 2023-03-13 RX ORDER — FENTANYL CITRATE 50 UG/ML
INJECTION, SOLUTION INTRAMUSCULAR; INTRAVENOUS AS NEEDED
Status: DISCONTINUED | OUTPATIENT
Start: 2023-03-13 | End: 2023-03-13 | Stop reason: HOSPADM

## 2023-03-13 RX ORDER — SODIUM CHLORIDE 9 MG/ML
INJECTION, SOLUTION INTRAVENOUS
Status: COMPLETED | OUTPATIENT
Start: 2023-03-13 | End: 2023-03-13

## 2023-03-13 RX ORDER — VERAPAMIL HYDROCHLORIDE 2.5 MG/ML
INJECTION, SOLUTION INTRAVENOUS AS NEEDED
Status: DISCONTINUED | OUTPATIENT
Start: 2023-03-13 | End: 2023-03-13 | Stop reason: HOSPADM

## 2023-03-13 RX ORDER — MIDAZOLAM HYDROCHLORIDE 1 MG/ML
INJECTION, SOLUTION INTRAMUSCULAR; INTRAVENOUS AS NEEDED
Status: DISCONTINUED | OUTPATIENT
Start: 2023-03-13 | End: 2023-03-13 | Stop reason: HOSPADM

## 2023-03-13 RX ORDER — DIPHENHYDRAMINE HYDROCHLORIDE 50 MG/ML
INJECTION, SOLUTION INTRAMUSCULAR; INTRAVENOUS AS NEEDED
Status: DISCONTINUED | OUTPATIENT
Start: 2023-03-13 | End: 2023-03-13 | Stop reason: HOSPADM

## 2023-03-13 RX ORDER — HYDROCORTISONE SODIUM SUCCINATE 100 MG/2ML
INJECTION, POWDER, FOR SOLUTION INTRAMUSCULAR; INTRAVENOUS AS NEEDED
Status: DISCONTINUED | OUTPATIENT
Start: 2023-03-13 | End: 2023-03-13 | Stop reason: HOSPADM

## 2023-03-13 RX ORDER — HEPARIN SODIUM 200 [USP'U]/100ML
INJECTION, SOLUTION INTRAVENOUS
Status: COMPLETED | OUTPATIENT
Start: 2023-03-13 | End: 2023-03-13

## 2023-03-13 RX ORDER — SODIUM CHLORIDE 0.9 % (FLUSH) 0.9 %
5-40 SYRINGE (ML) INJECTION EVERY 8 HOURS
Status: DISCONTINUED | OUTPATIENT
Start: 2023-03-13 | End: 2023-03-13 | Stop reason: HOSPADM

## 2023-03-13 NOTE — DISCHARGE INSTRUCTIONS
**Your heart arteries are normal.  The filling pressures in your heart are normal.  There are no clear cardiac reasons that would cause shortness of breath. **Start taking L-arginine 1000 mg twice daily -- this is a supplement that can improve your body's circulation. If this does not help with your shortness of breath, then you can stop taking this supplement. **There has been ONE change to your medications. Please see below for the final list.    **Dr. Sameer Magdaleno MD's office will contact you to schedule a follow-up appointment in about 1 month.**    YOUR CURRENT MEDICATIONS  Current Discharge Medication List        START taking these medications    Details   Arginine HCl, L-Arginine, 1,000 mg tab Take 1,000 mg by mouth two (2) times a day. Indications: Improve circulation  Qty: 60 Tablet, Refills: 5  Start date: 3/13/2023           CONTINUE these medications which have NOT CHANGED    Details   tamsulosin (FLOMAX) 0.4 mg capsule TAKE 1 CAPSULE BY MOUTH EVERY DAY  Qty: 90 Capsule, Refills: 3    Associated Diagnoses: Urinary urgency      levothyroxine (SYNTHROID) 75 mcg tablet TAKE 1 TABLET BY MOUTH EVERY DAY  Qty: 90 Tablet, Refills: 3    Associated Diagnoses: Acquired hypothyroidism      rosuvastatin (CRESTOR) 20 mg tablet Take 1 Tablet by mouth daily. Indications: high cholesterol  Qty: 90 Tablet, Refills: 3      pantoprazole (PROTONIX) 40 mg tablet Take 1 Tablet by mouth daily. Indications: gastroesophageal reflux disease  Qty: 90 Tablet, Refills: 3      rOPINIRole (REQUIP) 3 mg tab tab TAKE 1 TABLET BY MOUTH NIGHTLY  Qty: 90 Tablet, Refills: 3    Associated Diagnoses: Restless leg syndrome      sildenafil citrate (VIAGRA) 100 mg tablet TAKE 1 TABLET AS NEEDED FOR ERECTILE DYSFUNCTION. MAX DAILY DOSE 1 TABLET  Qty: 8 Tablet, Refills: 22    Associated Diagnoses: Erectile dysfunction, unspecified erectile dysfunction type      traZODone (DESYREL) 100 mg tablet Take 1 Tablet by mouth nightly. Indications: insomnia  Qty: 30 Tablet, Refills: 1    Associated Diagnoses: Insomnia, unspecified type      risperiDONE (RisperDAL) 2 mg tablet Take 1 Tablet by mouth daily. Qty: 30 Tablet, Refills: 1    Associated Diagnoses: Bipolar affective disorder, remission status unspecified (HCC)      dextroamphetamine-amphetamine (ADDERALL) 10 mg tablet Take 1 Tablet by mouth daily. Max Daily Amount: 10 mg.  Qty: 30 Tablet, Refills: 0    Associated Diagnoses: Attention deficit disorder, unspecified hyperactivity presence      amphetamine-dextroamphetamine XR (ADDERALL XR) 30 mg XR capsule Take 1 Capsule by mouth every morning. Max Daily Amount: 30 mg.  Qty: 30 Capsule, Refills: 0    Associated Diagnoses: Attention deficit disorder, unspecified hyperactivity presence      escitalopram oxalate (LEXAPRO) 5 mg tablet Take 10 mg by mouth daily. Multivitamins with Fluoride (MULTI-VITAMIN PO) Take  by mouth. cholecalciferol (VITAMIN D3) (1000 Units /25 mcg) tablet Take  by mouth daily. MAGNESIUM PO Take 200 mg by mouth. BIOTIN PO Take  by mouth. cycloSPORINE (Restasis) 0.05 % dpet Administer 1 Drop to both eyes every twelve (12) hours. ondansetron (ZOFRAN ODT) 4 mg disintegrating tablet Take 1 Tab by mouth every eight (8) hours as needed for Nausea or Vomiting. Qty: 15 Tab, Refills: 1    Associated Diagnoses: Nausea      Lactobac no.41/Bifidobact no.7 (PROBIOTIC-10 PO) Take  by mouth. STOP taking these medications       Symbicort 80-4.5 mcg/actuation HFAA Comments:   Reason for Stopping:                After Your Cardiac Catheterization    Cardiac catheterization (also called cardiac cath) is an invasive imaging procedure that allows your doctor to look at your coronary arteries to diagnose coronary artery disease. It can also be used to measure pressures in your chambers, and evaluate the function of your heart.     Instructions for going home after Cardiac Catheterization    Care for the Catheter Insertion Site  Procedures may be performed in the femoral artery in the groin (in the area at the top of your thigh) or in the radial artery in your arm. When you go home, there will be a bandage (dressing) over the catheter insertion site (also called the wound site). The morning after your procedure, you may take the dressing off. The easiest way to do this is when you are showering, get the tape and dressing wet and remove it. After the bandage is removed, cover the area with a small adhesive bandage. It is normal for the catheter insertion site to be black and blue for a couple of days. The site may also be slightly swollen and pink, and there may be a small lump (about the size of a quarter) at the site. Wash the catheter insertion site at least once daily with soap and water. Place soapy water on your hand or washcloth and gently wash the insertion site; do not rub. Keep the area clean and dry when you are not showering. Do not use powders, creams, lotions or ointment on the wound site. Wear loose clothes and loose underwear. Do not take a bath, tub soak, go in a Jacuzzi, or swim in a pool or lake for one week after the procedure. You may notice a small lump at the site. This is normal and may last up to 6 weeks. CHECK THE CATHETER INSERTION SITE DAILY:    If bleeding at the cath site occurs, take a clean washcloth and apply direct pressure just above the puncture site for at least 15 minutes. Call 911 immediately if the bleeding is not controlled. Continue to apply direct pressure until an ambulance gets to your location. Do not try to drive yourself or have someone else drive you to the hospital.  Have the ambulance bring you to the emergency room. Activity Guidelines  Your doctor will tell you when you can resume activities. In general, you will need to take it easy for the first two days after you get home. You can expect to feel tired and weak the day after the procedure. Take walks around your house and plan to rest during the day. For femoral cardiac cath  Do not strain during bowel movements for the first 3 to 4 days after the procedure to prevent bleeding from the catheter insertion site. Avoid heavy lifting (more than 10 pounds) and pushing or pulling heavy objects for the first 5 to 7 days after the procedure. Do not participate in strenuous activities for 5 days after the procedure. This includes most sports - jogging, golfing, play tennis, and bowling. You may climb stairs if needed, but walk up and down the stairs more slowly than usual.   Gradually increase your activities until you reach your normal activity level within one week after the procedure. For radial cardiac cath  Do not participate in strenuous activities for 2 days after the procedure. This includes most sports - jogging, golfing, play tennis, and bowling. Gradually increase your activities until you reach your normal activity level within two days after the procedure. Ask your doctor when it is safe to  Return to work. Resume sexual activity. Resume driving. Most people are able to resume driving within 24 hours after going home. Medications  Please review your medications with your doctor before you go home. Ask your doctor if you should continue taking the medications you were taking before the procedure. If you have diabetes, your doctor may adjust your diabetes medications for one to two days after your procedure. Please be sure to ask for specific directions about taking your diabetes medication after the procedure. Depending on the results of your procedure, your doctor may prescribe new medication. Please make sure you understand what medications you should be taking after the procedure and how often to take them. You may use Tylenol 325mg 1-2 tablets every 6 hours as needed for pain or discomfort, unless otherwise instructed.   If you have significant         discomfort more than 48 hours after your procedure, please call your physicians office. If you take METFORMIN, do NOT take this for the next 2 days after your procedure. Importance of a Heart-Healthy Lifestyle  It is important for you to be committed to leading a heart-healthy lifestyle. Your health care team can help you achieve your goals, but it is up to you to take your medications as prescribed, make dietary changes, quit smoking, exercise regularly, keep your follow-up appointments and be an active member of the treatment team.    Follow Up  Please call your cardiologist to schedule a follow-up appointment in the next 1-2 weeks if possible. Ask your primary care doctor when you should return for follow-up. Please ask your doctor if you have any questions about cardiac catheterization, angioplasty or stenting. If possible, have someone stay with you for the first night. It is normal to feel tired for the first couple of days. Take it easy and follow your physicians instructions on activity. CALL THE PHYSICIAN:  If the site becomes red, swollen, or feels warm to the touch, or is healing poorly    If you note any large/extending bruise, fever >101.0 or chills  If your extremity has numbness, tingling, discoloration, abnormal swelling, tightness or pain   If you have difficulty with urination or develop nausea, vomiting, or severe abdominal pain    SIGNS AND SYMPTOMS:  Notify your physician for new or recurrent symptoms of chest discomfort, unusual shortness of breath or fatigue. These could be signs of a problem and should be discussed with your physician. For significant chest pain or symptoms of angina not relieved with rest:  if you have been prescribed Nitroglycerin, take as directed (taken under the tongue, one at a time 5 minutes apart for a total of 3 doses, sitting down). If the discomfort is not relieved after the 3rd Nitroglycerin, call 911.   If you have not been prescribed Nitroglycerin and your chest discomfort is significant, call 911. Take the ambulance, do not try to drive yourself or have someone else drive you to the hospital.     AFTER CARE:  Follow up with your physician as instructed  Follow a heart healthy diet with proper portion control, daily stress management, daily exercise, blood pressure and cholesterol control, and smoking cessation. The success of your stent, if you had one placed, and the prevention of future catheterizations heavily depends on your lifestyle changes you make now! You may start walking short distances the day of your procedure. Gradually increase as tolerated each day. Current activity recommendations are 30 minutes of exercise at least 5 days a week. Work up to this as you recover. Walk, ride a bike, or choose any other activity you enjoy to reach this activity goal.   Avoid walking outside in extremes of heat or cold. Walk inside (at home, at the mall, or at a large store) when it is cold and windy or hot and humid. If you had a stent placed, consider [facility] as a resource to help you make the needed lifestyle changes to live a heart healthy lifestyle. Discuss your candidacy with your physician. If you have questions, call your physicians office or the Cardiac Cath Lab at [phone]. The Cath Lab is operational from 6:30 a.m. to 5:00 p.m., Monday through Friday. After hours, notify your physician. [address] [place] [address] can be reached at [phone]. [facility] is operational Monday-Thursday 8:30 a.m. to 5:00 p.m. and Friday 8:30 a.m. to 12:00 p.m.     Remember:  IN CASE OF BLEEDING: KEEP FIRM PRESSURE ON THE PROCEDURE SITE AND CALL 911 IF NOT CONTROLLED

## 2023-03-13 NOTE — PROGRESS NOTES
Cardiac Cath Lab Recovery Arrival Note:      Anum Cunningham arrived to Cardiac Cath Lab, Recovery Area. Staff introduced to patient. Patient identifiers verified with NAME and DATE OF BIRTH. Procedure verified with patient. Consent forms reviewed and signed by patient or authorized representative and verified. Allergies verified. Patient and family oriented to department. Patient and family informed of procedure and plan of care. Questions answered with review. Patient prepped for procedure, per orders from physician, prior to arrival.    Patient on cardiac monitor, non-invasive blood pressure, SPO2 monitor. On room air. Patient is A&Ox 4. Patient reports no complaints. Patient in stretcher, in low position, with side rails up, call bell within reach, patient instructed to call if assistance as needed. Patient prep in: [address], [address]. Patient family has pager # Marlon Brady [phone]  Family in: Waiting upstairs.    Prep by: Greg Frazier and Tez Kay RN

## 2023-03-13 NOTE — PROGRESS NOTES
Pt ambulated to bathroom with SB assist.     Reviewed discharge instructions with patient. Went over restrictions precautions, and site care. Patient taken to main entrance to meet [facility], [address] [place] [address] Drive,[address]. Site CDI at this time.

## 2023-03-13 NOTE — PROGRESS NOTES
TRANSFER - IN REPORT:    Verbal report received from [facility] on Mallorie Franco  being received from procedural area for routine post - op. Report consisted of patients Situation, Background, Assessment and Recommendations(SBAR). Information from the following report(s) SBAR, Procedure Summary, Intake/Output, and Cardiac Rhythm NSR  was reviewed with the receiving clinician. Opportunity for questions and clarification was provided. Assessment completed upon patients arrival to [address] [place] [address] and [facility] [place] [facility]. Cardiac Cath Lab Recovery Arrival Note:    Mallorie Franco arrived to [facility] recovery area. Patient procedure= R and LHC. Patient on cardiac monitor, non-invasive blood pressure, SPO2 monitor. On O2 @ 2 lpm via NC.  IV  of NS on pump at 50 ml/hr. Patient status doing well without problems. Patient is A&Ox 4. Patient reports no discomfort. PROCEDURE SITE CHECK:    Procedure site:without any bleeding or hematoma, no pain/discomfort reported at procedure site. No change in patient status. Continue to monitor patient and status.

## 2023-03-13 NOTE — PROGRESS NOTES
Cardiac Cath Lab Procedure Area Arrival Note:    Julián Trotter arrived to Cardiac Cath Lab, Procedure Area. Patient identifiers verified with NAME and DATE OF BIRTH. Procedure verified with patient. Consent forms verified. Allergies verified. Patient informed of procedure and plan of care. Questions answered with review. Patient voiced understanding of procedure and plan of care. Patient on cardiac monitor, non-invasive blood pressure, SPO2 monitor. On RA and placed on O2 @ 2 lpm via NC.  IV of NS on pump at 75 ml/hr. Patient status doing well without problems. Patient is A&Ox 4. Patient reports no CP and no SOB. Patient medicated during procedure with orders obtained and verified by Dr. Srinivas Rodriguez. Refer to patients Cardiac Cath Lab PROCEDURE REPORT for vital signs, assessment, status, and response during procedure, printed at end of case. Printed report on chart or scanned into chart. 15 CATH LAB to RECOVERY ROOM REPORT    Procedure: C/C     MD:    The procedure was diagnostic only    Verbal Report given to Recovery Nurse on patient being transferred to Cardiac Cath Lab RR for routine post-op. Patient stable upon transfer to . Vitals, mental status, MAR, procedural summary discussed with recovery RN. Medication given during procedure:    Contrast:20mL                          Heparin:6,000units     Versed:3mg                                                               Fentanyl:50mcg                                                             Sheaths:    Right radial sheath pulled at 1507 pm, band at 15mL of air        Right brachial vein sheath pulled at 1507 pm, secured with band-aid.

## 2023-05-22 RX ORDER — RISPERIDONE 2 MG/1
2 TABLET ORAL DAILY
COMMUNITY
Start: 2022-03-21

## 2023-05-22 RX ORDER — LEVOTHYROXINE SODIUM 0.07 MG/1
1 TABLET ORAL DAILY
COMMUNITY
Start: 2023-01-16

## 2023-05-22 RX ORDER — TRAZODONE HYDROCHLORIDE 100 MG/1
100 TABLET ORAL
COMMUNITY
Start: 2022-03-21

## 2023-05-22 RX ORDER — PANTOPRAZOLE SODIUM 40 MG/1
40 TABLET, DELAYED RELEASE ORAL DAILY
COMMUNITY
Start: 2022-11-09

## 2023-05-22 RX ORDER — ESCITALOPRAM OXALATE 5 MG/1
10 TABLET ORAL DAILY
COMMUNITY
Start: 2020-06-29

## 2023-05-22 RX ORDER — DEXTROAMPHETAMINE SACCHARATE, AMPHETAMINE ASPARTATE, DEXTROAMPHETAMINE SULFATE AND AMPHETAMINE SULFATE 2.5; 2.5; 2.5; 2.5 MG/1; MG/1; MG/1; MG/1
10 TABLET ORAL DAILY
COMMUNITY
Start: 2022-02-16

## 2023-05-22 RX ORDER — ROSUVASTATIN CALCIUM 20 MG/1
20 TABLET, COATED ORAL DAILY
COMMUNITY
Start: 2022-12-27

## 2023-05-22 RX ORDER — ONDANSETRON 4 MG/1
4 TABLET, ORALLY DISINTEGRATING ORAL EVERY 8 HOURS PRN
COMMUNITY
Start: 2021-04-01

## 2023-05-22 RX ORDER — TAMSULOSIN HYDROCHLORIDE 0.4 MG/1
1 CAPSULE ORAL DAILY
COMMUNITY
Start: 2023-02-03

## 2023-05-22 RX ORDER — DEXTROAMPHETAMINE SACCHARATE, AMPHETAMINE ASPARTATE MONOHYDRATE, DEXTROAMPHETAMINE SULFATE AND AMPHETAMINE SULFATE 7.5; 7.5; 7.5; 7.5 MG/1; MG/1; MG/1; MG/1
30 CAPSULE, EXTENDED RELEASE ORAL
COMMUNITY
Start: 2022-02-16

## 2023-05-22 RX ORDER — SILDENAFIL 100 MG/1
TABLET, FILM COATED ORAL
COMMUNITY
Start: 2022-07-01

## 2023-05-22 RX ORDER — CYCLOSPORINE 0.5 MG/ML
1 EMULSION OPHTHALMIC EVERY 12 HOURS
COMMUNITY

## 2023-05-22 RX ORDER — ROPINIROLE 3 MG/1
1 TABLET, FILM COATED ORAL NIGHTLY
COMMUNITY
Start: 2022-09-12

## 2023-08-15 DIAGNOSIS — N52.9 MALE ERECTILE DYSFUNCTION, UNSPECIFIED: ICD-10-CM

## 2023-08-15 RX ORDER — SILDENAFIL 100 MG/1
TABLET, FILM COATED ORAL
Qty: 8 TABLET | Refills: 22 | Status: SHIPPED | OUTPATIENT
Start: 2023-08-15

## 2023-09-10 DIAGNOSIS — G25.81 RESTLESS LEGS SYNDROME: ICD-10-CM

## 2023-09-11 RX ORDER — ROPINIROLE 3 MG/1
TABLET, FILM COATED ORAL NIGHTLY
Qty: 90 TABLET | Refills: 0 | Status: SHIPPED | OUTPATIENT
Start: 2023-09-11

## 2023-11-09 RX ORDER — PANTOPRAZOLE SODIUM 40 MG/1
TABLET, DELAYED RELEASE ORAL
Qty: 90 TABLET | Refills: 3 | OUTPATIENT
Start: 2023-11-09

## 2024-01-11 RX ORDER — ROSUVASTATIN CALCIUM 20 MG/1
TABLET, COATED ORAL
Qty: 90 TABLET | Refills: 3 | OUTPATIENT
Start: 2024-01-11

## 2024-01-18 DIAGNOSIS — E03.9 HYPOTHYROIDISM, UNSPECIFIED: ICD-10-CM

## 2024-01-18 RX ORDER — LEVOTHYROXINE SODIUM 0.07 MG/1
75 TABLET ORAL DAILY
Qty: 90 TABLET | Refills: 3 | OUTPATIENT
Start: 2024-01-18

## 2024-07-21 ENCOUNTER — HOSPITAL ENCOUNTER (EMERGENCY)
Facility: HOSPITAL | Age: 59
Discharge: HOME | End: 2024-07-21
Attending: EMERGENCY MEDICINE
Payer: COMMERCIAL

## 2024-07-21 VITALS
TEMPERATURE: 98.1 F | HEART RATE: 98 BPM | DIASTOLIC BLOOD PRESSURE: 72 MMHG | HEIGHT: 70 IN | OXYGEN SATURATION: 95 % | BODY MASS INDEX: 43.4 KG/M2 | RESPIRATION RATE: 13 BRPM | WEIGHT: 303.13 LBS | SYSTOLIC BLOOD PRESSURE: 112 MMHG

## 2024-07-21 DIAGNOSIS — R42 INTERMITTENT LIGHTHEADEDNESS: Primary | ICD-10-CM

## 2024-07-21 LAB
ALBUMIN SERPL-MCNC: 3.4 G/DL (ref 3.5–5)
ALBUMIN/GLOB SERPL: 0.9 (ref 1.1–2.2)
ALP SERPL-CCNC: 84 U/L (ref 45–117)
ALT SERPL-CCNC: 29 U/L (ref 12–78)
ANION GAP SERPL CALC-SCNC: 12 MMOL/L (ref 5–15)
AST SERPL-CCNC: 19 U/L (ref 15–37)
BASOPHILS # BLD: 0.1 K/UL (ref 0–0.1)
BASOPHILS NFR BLD: 1 % (ref 0–1)
BILIRUB SERPL-MCNC: 0.3 MG/DL (ref 0.2–1)
BUN SERPL-MCNC: 12 MG/DL (ref 6–20)
BUN/CREAT SERPL: 8 (ref 12–20)
CALCIUM SERPL-MCNC: 8.8 MG/DL (ref 8.5–10.1)
CHLORIDE SERPL-SCNC: 102 MMOL/L (ref 97–108)
CO2 SERPL-SCNC: 27 MMOL/L (ref 21–32)
COMMENT:: NORMAL
CREAT SERPL-MCNC: 1.56 MG/DL (ref 0.7–1.3)
DIFFERENTIAL METHOD BLD: ABNORMAL
EOSINOPHIL # BLD: 0.2 K/UL (ref 0–0.4)
EOSINOPHIL NFR BLD: 2 % (ref 0–7)
ERYTHROCYTE [DISTWIDTH] IN BLOOD BY AUTOMATED COUNT: 15.1 % (ref 11.5–14.5)
GLOBULIN SER CALC-MCNC: 3.7 G/DL (ref 2–4)
GLUCOSE BLD STRIP.AUTO-MCNC: 101 MG/DL (ref 65–117)
GLUCOSE SERPL-MCNC: 111 MG/DL (ref 65–100)
HCT VFR BLD AUTO: 47.2 % (ref 36.6–50.3)
HGB BLD-MCNC: 15.1 G/DL (ref 12.1–17)
IMM GRANULOCYTES # BLD AUTO: 0 K/UL (ref 0–0.04)
IMM GRANULOCYTES NFR BLD AUTO: 0 % (ref 0–0.5)
LYMPHOCYTES # BLD: 2.5 K/UL (ref 0.8–3.5)
LYMPHOCYTES NFR BLD: 21 % (ref 12–49)
MAGNESIUM SERPL-MCNC: 1.8 MG/DL (ref 1.6–2.4)
MCH RBC QN AUTO: 26.9 PG (ref 26–34)
MCHC RBC AUTO-ENTMCNC: 32 G/DL (ref 30–36.5)
MCV RBC AUTO: 84.1 FL (ref 80–99)
MONOCYTES # BLD: 1.1 K/UL (ref 0–1)
MONOCYTES NFR BLD: 10 % (ref 5–13)
NEUTS SEG # BLD: 7.7 K/UL (ref 1.8–8)
NEUTS SEG NFR BLD: 66 % (ref 32–75)
NRBC # BLD: 0 K/UL (ref 0–0.01)
NRBC BLD-RTO: 0 PER 100 WBC
PLATELET # BLD AUTO: 298 K/UL (ref 150–400)
PMV BLD AUTO: 10.3 FL (ref 8.9–12.9)
POTASSIUM SERPL-SCNC: 4 MMOL/L (ref 3.5–5.1)
PROT SERPL-MCNC: 7.1 G/DL (ref 6.4–8.2)
RBC # BLD AUTO: 5.61 M/UL (ref 4.1–5.7)
SERVICE CMNT-IMP: NORMAL
SODIUM SERPL-SCNC: 141 MMOL/L (ref 136–145)
SPECIMEN HOLD: NORMAL
TROPONIN I SERPL HS-MCNC: 7 NG/L (ref 0–76)
TSH SERPL DL<=0.05 MIU/L-ACNC: 3.18 UIU/ML (ref 0.36–3.74)
WBC # BLD AUTO: 11.6 K/UL (ref 4.1–11.1)

## 2024-07-21 PROCEDURE — 83735 ASSAY OF MAGNESIUM: CPT

## 2024-07-21 PROCEDURE — 80053 COMPREHEN METABOLIC PANEL: CPT

## 2024-07-21 PROCEDURE — 99284 EMERGENCY DEPT VISIT MOD MDM: CPT

## 2024-07-21 PROCEDURE — 85025 COMPLETE CBC W/AUTO DIFF WBC: CPT

## 2024-07-21 PROCEDURE — 93005 ELECTROCARDIOGRAM TRACING: CPT | Performed by: EMERGENCY MEDICINE

## 2024-07-21 PROCEDURE — 82962 GLUCOSE BLOOD TEST: CPT

## 2024-07-21 PROCEDURE — 84484 ASSAY OF TROPONIN QUANT: CPT

## 2024-07-21 PROCEDURE — 6370000000 HC RX 637 (ALT 250 FOR IP): Performed by: EMERGENCY MEDICINE

## 2024-07-21 PROCEDURE — 84443 ASSAY THYROID STIM HORMONE: CPT

## 2024-07-21 RX ORDER — MECLIZINE HYDROCHLORIDE 25 MG/1
25 TABLET ORAL 3 TIMES DAILY PRN
Qty: 20 TABLET | Refills: 0 | Status: SHIPPED | OUTPATIENT
Start: 2024-07-21 | End: 2024-07-31

## 2024-07-21 RX ORDER — MECLIZINE HYDROCHLORIDE 25 MG/1
25 TABLET ORAL
Status: COMPLETED | OUTPATIENT
Start: 2024-07-21 | End: 2024-07-21

## 2024-07-21 RX ADMIN — MECLIZINE HYDROCHLORIDE 25 MG: 25 TABLET ORAL at 17:49

## 2024-07-21 NOTE — ED NOTES
Patient medicated as ordered by provider, tolerated well. Patient resting on stretcher with family present at bedside. Call light within reach.

## 2024-07-21 NOTE — ED NOTES
Patient states he is not feeling any differently after being medicated. Provider notified. Patient resting on stretcher with family at bedside.

## 2024-07-21 NOTE — ED TRIAGE NOTES
Signs and symptoms: I have had intermittent light headedness and dizziness for a few months today it started about 3pm and its lasting longer than usual. I feel like I'm drunk pt denies other symptoms.  Code Stroke activation time: no code S per Dr Dailey  Provider at bedside time:  1725 (spoken with)  VAN score: Negative  Last Known Well (Time): 3pm  Blood Glucose Result/Time: 101  Blood Pressure: 120/79  Anticoagulants (List medications): no

## 2024-07-22 LAB
EKG ATRIAL RATE: 103 BPM
EKG DIAGNOSIS: NORMAL
EKG P AXIS: 44 DEGREES
EKG P-R INTERVAL: 142 MS
EKG Q-T INTERVAL: 334 MS
EKG QRS DURATION: 74 MS
EKG QTC CALCULATION (BAZETT): 437 MS
EKG R AXIS: 44 DEGREES
EKG T AXIS: 41 DEGREES
EKG VENTRICULAR RATE: 103 BPM

## 2024-07-22 PROCEDURE — 93010 ELECTROCARDIOGRAM REPORT: CPT | Performed by: SPECIALIST

## 2024-07-22 NOTE — ED PROVIDER NOTES
R-0Normal               (Please note that portions of this note were completed with a voice recognition program.  Efforts were made to edit the dictations but occasionally words are mis-transcribed.)    Constantino Dailey MD (electronically signed)  Emergency Attending Physician      Constantino Dailey MD  07/21/24 [id number]

## 2025-07-01 ENCOUNTER — TRANSCRIBE ORDERS (OUTPATIENT)
Facility: HOSPITAL | Age: 60
End: 2025-07-01

## 2025-07-01 DIAGNOSIS — N18.31 STAGE 3A CHRONIC KIDNEY DISEASE (HCC): Primary | ICD-10-CM

## (undated) DEVICE — WASTE KIT - ST MARY: Brand: MEDLINE INDUSTRIES, INC.

## (undated) DEVICE — TR BAND RADIAL ARTERY COMPRESSION DEVICE: Brand: TR BAND

## (undated) DEVICE — Device

## (undated) DEVICE — ANGIOGRAPHY KIT

## (undated) DEVICE — PACK PROCEDURE SURG HRT CATH

## (undated) DEVICE — KIT HND CTRL 3 W STPCOCK ROT END 54IN PREM HI PRSS TBNG AT

## (undated) DEVICE — SPECIAL PROCEDURE DRAPE 32" X 34": Brand: SPECIAL PROCEDURE DRAPE

## (undated) DEVICE — PROVE COVER: Brand: UNBRANDED

## (undated) DEVICE — GLIDESHEATH SLENDER STAINLESS STEEL KIT: Brand: GLIDESHEATH SLENDER

## (undated) DEVICE — BALLOON WEDGE CATHETER 6 FR 110 CM --

## (undated) DEVICE — KIT MFLD ISOLATN NACL CNTRST PRT TBNG SPIK W/ PRSS TRNSDUC

## (undated) DEVICE — KIT MED IMAG CNTRST AGNT W/ IOPAMIDOL REUSE